# Patient Record
Sex: MALE | Race: OTHER | HISPANIC OR LATINO | Employment: UNEMPLOYED | URBAN - METROPOLITAN AREA
[De-identification: names, ages, dates, MRNs, and addresses within clinical notes are randomized per-mention and may not be internally consistent; named-entity substitution may affect disease eponyms.]

---

## 2023-01-09 ENCOUNTER — HOSPITAL ENCOUNTER (EMERGENCY)
Facility: HOSPITAL | Age: 33
Discharge: HOME | End: 2023-01-09
Attending: FAMILY MEDICINE

## 2023-01-09 VITALS
BODY MASS INDEX: 42.8 KG/M2 | HEIGHT: 67 IN | DIASTOLIC BLOOD PRESSURE: 89 MMHG | SYSTOLIC BLOOD PRESSURE: 139 MMHG | TEMPERATURE: 98 F | OXYGEN SATURATION: 97 % | WEIGHT: 272.69 LBS | RESPIRATION RATE: 18 BRPM | HEART RATE: 84 BPM

## 2023-01-09 DIAGNOSIS — M54.31 SCIATICA OF RIGHT SIDE: Primary | ICD-10-CM

## 2023-01-09 DIAGNOSIS — M79.651 RIGHT THIGH PAIN: ICD-10-CM

## 2023-01-09 PROCEDURE — 96372 THER/PROPH/DIAG INJ SC/IM: CPT | Performed by: NURSE PRACTITIONER

## 2023-01-09 PROCEDURE — 63600175 PHARM REV CODE 636 W HCPCS: Performed by: NURSE PRACTITIONER

## 2023-01-09 PROCEDURE — 99284 EMERGENCY DEPT VISIT MOD MDM: CPT

## 2023-01-09 RX ORDER — BACLOFEN 10 MG/1
10 TABLET ORAL 3 TIMES DAILY PRN
Qty: 21 TABLET | Refills: 0 | Status: SHIPPED | OUTPATIENT
Start: 2023-01-09 | End: 2023-01-16

## 2023-01-09 RX ORDER — DICLOFENAC SODIUM 75 MG/1
75 TABLET, DELAYED RELEASE ORAL 2 TIMES DAILY
Qty: 14 TABLET | Refills: 0 | Status: SHIPPED | OUTPATIENT
Start: 2023-01-09 | End: 2023-01-16

## 2023-01-09 RX ORDER — KETOROLAC TROMETHAMINE 30 MG/ML
30 INJECTION, SOLUTION INTRAMUSCULAR; INTRAVENOUS
Status: COMPLETED | OUTPATIENT
Start: 2023-01-09 | End: 2023-01-09

## 2023-01-09 RX ADMIN — KETOROLAC TROMETHAMINE 30 MG: 30 INJECTION, SOLUTION INTRAMUSCULAR; INTRAVENOUS at 09:01
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
## 2024-07-24 ENCOUNTER — HOSPITAL ENCOUNTER (EMERGENCY)
Facility: HOSPITAL | Age: 34
Discharge: HOME | End: 2024-07-24
Attending: STUDENT IN AN ORGANIZED HEALTH CARE EDUCATION/TRAINING PROGRAM

## 2024-07-24 VITALS
BODY MASS INDEX: 39.14 KG/M2 | HEART RATE: 85 BPM | TEMPERATURE: 98 F | RESPIRATION RATE: 20 BRPM | HEIGHT: 70 IN | DIASTOLIC BLOOD PRESSURE: 88 MMHG | WEIGHT: 273.38 LBS | SYSTOLIC BLOOD PRESSURE: 136 MMHG | OXYGEN SATURATION: 99 %

## 2024-07-24 DIAGNOSIS — H60.501 ACUTE OTITIS EXTERNA OF RIGHT EAR, UNSPECIFIED TYPE: Primary | ICD-10-CM

## 2024-07-24 PROCEDURE — 99283 EMERGENCY DEPT VISIT LOW MDM: CPT

## 2024-07-24 RX ORDER — OFLOXACIN 3 MG/ML
10 SOLUTION AURICULAR (OTIC) 2 TIMES DAILY
Qty: 10 ML | Refills: 0 | Status: SHIPPED | OUTPATIENT
Start: 2024-07-24 | End: 2024-07-24

## 2024-07-24 RX ORDER — OFLOXACIN 3 MG/ML
10 SOLUTION AURICULAR (OTIC) DAILY
Qty: 10 ML | Refills: 0 | Status: SHIPPED | OUTPATIENT
Start: 2024-07-24 | End: 2024-07-31

## 2024-07-25 NOTE — ED PROVIDER NOTES
Encounter Date: 7/24/2024       History     Chief Complaint   Patient presents with    Otalgia     Bilat otalgia and fullness. Denies hearing changes or drainage     Rashaad Jj is a 33 y.o. male who presents to the ED complaining of right ear pain x 2 days. Reports ear feels swollen. Denies fevers, chills, congestion, hearing loss, drainage.     The history is provided by the patient.     Review of patient's allergies indicates:  No Known Allergies  No past medical history on file.  No past surgical history on file.  No family history on file.  Social History     Tobacco Use    Smoking status: Never    Smokeless tobacco: Never   Substance Use Topics    Alcohol use: Never    Drug use: Never     Review of Systems   Constitutional:  Negative for activity change, chills and fever.   HENT:  Positive for ear pain. Negative for congestion and trouble swallowing.    Eyes:  Negative for photophobia and visual disturbance.   Respiratory:  Negative for chest tightness, shortness of breath and wheezing.    Cardiovascular:  Negative for chest pain, palpitations and leg swelling.   Gastrointestinal:  Negative for abdominal pain, constipation, diarrhea, nausea and vomiting.   Genitourinary:  Negative for dysuria, frequency, hematuria and urgency.   Musculoskeletal:  Negative for arthralgias, back pain and gait problem.   Skin:  Negative for color change and rash.   Neurological:  Negative for dizziness, syncope, weakness, light-headedness, numbness and headaches.   Psychiatric/Behavioral:  Negative for agitation and confusion. The patient is not nervous/anxious.        Physical Exam     Initial Vitals [07/24/24 1922]   BP Pulse Resp Temp SpO2   (!) 155/100 87 18 98 °F (36.7 °C) 98 %      MAP       --         Physical Exam    Nursing note and vitals reviewed.  Constitutional: He appears well-developed and well-nourished. No distress.   HENT:   Head: Normocephalic and atraumatic.   Right Ear: There is drainage.  Tympanic membrane is not erythematous. A middle ear effusion is present.   Left Ear: Tympanic membrane, external ear and ear canal normal.   Mouth/Throat: No oropharyngeal exudate.   Eyes: EOM are normal. No scleral icterus.   Neck: Neck supple.   Normal range of motion.  Cardiovascular:  Normal rate and regular rhythm.           No murmur heard.  Pulmonary/Chest: No respiratory distress. He has no wheezes.   Abdominal: Abdomen is soft. He exhibits no distension. There is no abdominal tenderness.   Musculoskeletal:         General: No edema. Normal range of motion.      Cervical back: Normal range of motion and neck supple.     Neurological: He is alert and oriented to person, place, and time. No cranial nerve deficit.   Skin: Skin is warm and dry. Capillary refill takes less than 2 seconds. No erythema.   Psychiatric: He has a normal mood and affect. Thought content normal.         ED Course   Procedures  Labs Reviewed - No data to display       Imaging Results    None          Medications - No data to display  Medical Decision Making  Right otitis externa.pt stable for discharge with ofloxacin drops x 7 days. Instructed to follow up with PCP in 3 days. ED return precautions given. He verbalized understanding. All questions answered.     Risk  Prescription drug management.                                      Clinical Impression:  Final diagnoses:  [H60.501] Acute otitis externa of right ear, unspecified type (Primary)          ED Disposition Condition    Discharge Stable          ED Prescriptions       Medication Sig Dispense Start Date End Date Auth. Provider    ofloxacin (FLOXIN) 0.3 % otic solution  (Status: Discontinued) Place 10 drops into the right ear 2 (two) times daily. for 7 days 10 mL 7/24/2024 7/24/2024 Indira Sheehan PA-C    ofloxacin (FLOXIN) 0.3 % otic solution Place 10 drops into the right ear once daily. for 7 days 10 mL 7/24/2024 7/31/2024 Indira Sheehan PA-C          Follow-up  Information       Follow up With Specialties Details Why Contact Info    [facility] Emergency Medicine  If symptoms worsen [address] [facility] [address] [phone]    PCP  In 3 days Hospital follow up              Indira Sheehan PA-C  07/24/24 2011

## 2024-10-01 ENCOUNTER — HOSPITAL ENCOUNTER (EMERGENCY)
Facility: HOSPITAL | Age: 34
Discharge: HOME | End: 2024-10-01
Attending: EMERGENCY MEDICINE

## 2024-10-01 VITALS
RESPIRATION RATE: 16 BRPM | OXYGEN SATURATION: 97 % | HEART RATE: 98 BPM | TEMPERATURE: 98 F | DIASTOLIC BLOOD PRESSURE: 92 MMHG | SYSTOLIC BLOOD PRESSURE: 121 MMHG | WEIGHT: 270 LBS | HEIGHT: 68 IN | BODY MASS INDEX: 40.92 KG/M2

## 2024-10-01 DIAGNOSIS — R10.9 ABDOMINAL PAIN, UNSPECIFIED ABDOMINAL LOCATION: Primary | ICD-10-CM

## 2024-10-01 LAB
ALBUMIN SERPL-MCNC: 4.3 G/DL (ref 3.5–5)
ALBUMIN/GLOB SERPL: 1.1 RATIO (ref 1.1–2)
ALP SERPL-CCNC: 61 UNIT/L (ref 40–150)
ALT SERPL-CCNC: 31 UNIT/L (ref 0–55)
ANION GAP SERPL CALC-SCNC: 9 MEQ/L
AST SERPL-CCNC: 18 UNIT/L (ref 5–34)
BASOPHILS # BLD AUTO: 0.05 X10(3)/MCL
BASOPHILS NFR BLD AUTO: 0.5 %
BILIRUB SERPL-MCNC: 0.3 MG/DL
BILIRUB UR QL STRIP.AUTO: NEGATIVE
BUN SERPL-MCNC: 16.3 MG/DL (ref 8.9–20.6)
CALCIUM SERPL-MCNC: 10.6 MG/DL (ref 8.4–10.2)
CHLORIDE SERPL-SCNC: 107 MMOL/L (ref 98–107)
CLARITY UR: CLEAR
CO2 SERPL-SCNC: 24 MMOL/L (ref 22–29)
COLOR UR AUTO: NORMAL
CREAT SERPL-MCNC: 0.88 MG/DL (ref 0.73–1.18)
CREAT/UREA NIT SERPL: 19
EOSINOPHIL # BLD AUTO: 0.18 X10(3)/MCL (ref 0–0.9)
EOSINOPHIL NFR BLD AUTO: 1.8 %
ERYTHROCYTE [DISTWIDTH] IN BLOOD BY AUTOMATED COUNT: 13.6 % (ref 11.5–17)
GFR SERPLBLD CREATININE-BSD FMLA CKD-EPI: >60 ML/MIN/1.73/M2
GLOBULIN SER-MCNC: 3.8 GM/DL (ref 2.4–3.5)
GLUCOSE SERPL-MCNC: 102 MG/DL (ref 74–100)
GLUCOSE UR QL STRIP: NEGATIVE
HCT VFR BLD AUTO: 44.1 % (ref 42–52)
HGB BLD-MCNC: 14.3 G/DL (ref 14–18)
HGB UR QL STRIP: NEGATIVE
IMM GRANULOCYTES # BLD AUTO: 0.04 X10(3)/MCL (ref 0–0.04)
IMM GRANULOCYTES NFR BLD AUTO: 0.4 %
KETONES UR QL STRIP: NEGATIVE
LEUKOCYTE ESTERASE UR QL STRIP: NEGATIVE
LIPASE SERPL-CCNC: 29 U/L
LYMPHOCYTES # BLD AUTO: 3.08 X10(3)/MCL (ref 0.6–4.6)
LYMPHOCYTES NFR BLD AUTO: 31 %
MCH RBC QN AUTO: 25.6 PG (ref 27–31)
MCHC RBC AUTO-ENTMCNC: 32.4 G/DL (ref 33–36)
MCV RBC AUTO: 79 FL (ref 80–94)
MONOCYTES # BLD AUTO: 1.23 X10(3)/MCL (ref 0.1–1.3)
MONOCYTES NFR BLD AUTO: 12.4 %
NEUTROPHILS # BLD AUTO: 5.37 X10(3)/MCL (ref 2.1–9.2)
NEUTROPHILS NFR BLD AUTO: 53.9 %
NITRITE UR QL STRIP: NEGATIVE
NRBC BLD AUTO-RTO: 0 %
PH UR STRIP: 6.5 [PH]
PLATELET # BLD AUTO: 267 X10(3)/MCL (ref 130–400)
PMV BLD AUTO: 10 FL (ref 7.4–10.4)
POTASSIUM SERPL-SCNC: 4.3 MMOL/L (ref 3.5–5.1)
PROT SERPL-MCNC: 8.1 GM/DL (ref 6.4–8.3)
PROT UR QL STRIP: NEGATIVE
RBC # BLD AUTO: 5.58 X10(6)/MCL (ref 4.7–6.1)
SODIUM SERPL-SCNC: 140 MMOL/L (ref 136–145)
SP GR UR STRIP.AUTO: 1.01 (ref 1–1.03)
UROBILINOGEN UR STRIP-ACNC: 0.2
WBC # BLD AUTO: 9.95 X10(3)/MCL (ref 4.5–11.5)

## 2024-10-01 PROCEDURE — 25500020 PHARM REV CODE 255: Performed by: NURSE PRACTITIONER

## 2024-10-01 PROCEDURE — 85025 COMPLETE CBC W/AUTO DIFF WBC: CPT | Performed by: NURSE PRACTITIONER

## 2024-10-01 PROCEDURE — 96374 THER/PROPH/DIAG INJ IV PUSH: CPT

## 2024-10-01 PROCEDURE — 83690 ASSAY OF LIPASE: CPT | Performed by: NURSE PRACTITIONER

## 2024-10-01 PROCEDURE — 81003 URINALYSIS AUTO W/O SCOPE: CPT | Performed by: NURSE PRACTITIONER

## 2024-10-01 PROCEDURE — 63600175 PHARM REV CODE 636 W HCPCS: Performed by: NURSE PRACTITIONER

## 2024-10-01 PROCEDURE — 80053 COMPREHEN METABOLIC PANEL: CPT | Performed by: NURSE PRACTITIONER

## 2024-10-01 PROCEDURE — 25000003 PHARM REV CODE 250: Performed by: NURSE PRACTITIONER

## 2024-10-01 PROCEDURE — 99285 EMERGENCY DEPT VISIT HI MDM: CPT | Mod: 25

## 2024-10-01 RX ORDER — KETOROLAC TROMETHAMINE 30 MG/ML
30 INJECTION, SOLUTION INTRAMUSCULAR; INTRAVENOUS
Status: COMPLETED | OUTPATIENT
Start: 2024-10-01 | End: 2024-10-01

## 2024-10-01 RX ORDER — HYDROCODONE BITARTRATE AND ACETAMINOPHEN 5; 325 MG/1; MG/1
1 TABLET ORAL EVERY 6 HOURS PRN
Qty: 12 TABLET | Refills: 0 | Status: SHIPPED | OUTPATIENT
Start: 2024-10-01 | End: 2024-10-13

## 2024-10-01 RX ORDER — SODIUM CHLORIDE 9 MG/ML
1000 INJECTION, SOLUTION INTRAVENOUS
Status: COMPLETED | OUTPATIENT
Start: 2024-10-01 | End: 2024-10-01

## 2024-10-01 RX ORDER — HYDROCODONE BITARTRATE AND ACETAMINOPHEN 7.5; 325 MG/1; MG/1
1 TABLET ORAL
Status: COMPLETED | OUTPATIENT
Start: 2024-10-01 | End: 2024-10-01

## 2024-10-01 RX ADMIN — IOHEXOL 100 ML: 350 INJECTION, SOLUTION INTRAVENOUS at 06:10

## 2024-10-01 RX ADMIN — KETOROLAC TROMETHAMINE 30 MG: 30 INJECTION, SOLUTION INTRAMUSCULAR; INTRAVENOUS at 04:10

## 2024-10-01 RX ADMIN — HYDROCODONE BITARTRATE AND ACETAMINOPHEN 1 TABLET: 7.5; 325 TABLET ORAL at 07:10

## 2024-10-01 RX ADMIN — SODIUM CHLORIDE 1000 ML: 9 INJECTION, SOLUTION INTRAVENOUS at 04:10

## 2024-10-01 NOTE — ED PROVIDER NOTES
Encounter Date: 10/1/2024       History     Chief Complaint   Patient presents with    Abdominal Pain     LUQ abdominal pain x3 weeks; denies other symptoms     An  was used for patient interaction.  Patient states generalized abdominal pain intermittently x3 weeks.  Denies any nausea, vomiting, diarrhea, constipation, or fever.  States that pain feels like bloating or pressure.  Denies any dysuria.  Patient denies any past medical history.    The history is provided by the patient. The history is limited by a language barrier. A  was used.   Abdominal Pain  The current episode started several weeks ago. The onset of the illness was gradual. The problem has not changed since onset.The abdominal pain is generalized. The abdominal pain does not radiate. The severity of the abdominal pain is 8/10. The abdominal pain is relieved by nothing. The other symptoms of the illness do not include fever, nausea, vomiting, diarrhea or dysuria.     Review of patient's allergies indicates:  No Known Allergies  No past medical history on file.  No past surgical history on file.  No family history on file.  Social History     Tobacco Use    Smoking status: Never    Smokeless tobacco: Never   Substance Use Topics    Alcohol use: Never    Drug use: Never     Review of Systems   Constitutional: Negative.  Negative for fever.   HENT: Negative.     Eyes: Negative.    Respiratory: Negative.     Cardiovascular: Negative.    Gastrointestinal:  Positive for abdominal pain. Negative for diarrhea, nausea and vomiting.   Endocrine: Negative.    Genitourinary: Negative.  Negative for dysuria.   Musculoskeletal: Negative.    Skin: Negative.    Allergic/Immunologic: Negative.    Neurological: Negative.    Hematological: Negative.    Psychiatric/Behavioral: Negative.     All other systems reviewed and are negative.      Physical Exam     Initial Vitals [10/01/24 1517]   BP Pulse Resp Temp SpO2   (!) 121/92 98 18 98.1  °F (36.7 °C) 97 %      MAP       --         Physical Exam    Nursing note and vitals reviewed.  Constitutional: He appears well-developed and well-nourished. No distress.   HENT:   Head: Normocephalic and atraumatic. Mouth/Throat: Oropharynx is clear and moist.   Eyes: Conjunctivae and EOM are normal. Pupils are equal, round, and reactive to light.   Neck: Neck supple.   Normal range of motion.  Cardiovascular:  Normal rate, regular rhythm, normal heart sounds and intact distal pulses.           Pulmonary/Chest: Breath sounds normal. No respiratory distress. He has no wheezes.   Abdominal: Abdomen is soft. Bowel sounds are normal. He exhibits no distension. There is no abdominal tenderness.   Musculoskeletal:         General: No edema. Normal range of motion.      Cervical back: Normal range of motion and neck supple.     Neurological: He is alert and oriented to person, place, and time. He has normal strength. GCS score is 15. GCS eye subscore is 4. GCS verbal subscore is 5. GCS motor subscore is 6.   Skin: Skin is warm and dry. No rash noted.   Psychiatric: He has a normal mood and affect. Thought content normal.         ED Course   Procedures  Labs Reviewed   COMPREHENSIVE METABOLIC PANEL - Abnormal       Result Value    Sodium 140      Potassium 4.3      Chloride 107      CO2 24      Glucose 102 (*)     Blood Urea Nitrogen 16.3      Creatinine 0.88      Calcium 10.6 (*)     Protein Total 8.1      Albumin 4.3      Globulin 3.8 (*)     Albumin/Globulin Ratio 1.1      Bilirubin Total 0.3      ALP 61      ALT 31      AST 18      eGFR >60      Anion Gap 9.0      BUN/Creatinine Ratio 19     CBC WITH DIFFERENTIAL - Abnormal    WBC 9.95      RBC 5.58      Hgb 14.3      Hct 44.1      MCV 79.0 (*)     MCH 25.6 (*)     MCHC 32.4 (*)     RDW 13.6      Platelet 267      MPV 10.0      Neut % 53.9      Lymph % 31.0      Mono % 12.4      Eos % 1.8      Basophil % 0.5      Lymph # 3.08      Neut # 5.37      Mono # 1.23      Eos  # 0.18      Baso # 0.05      IG# 0.04      IG% 0.4      NRBC% 0.0     URINALYSIS, REFLEX TO URINE CULTURE - Normal    Color, UA Straw      Appearance, UA Clear      Specific Gravity, UA 1.015      pH, UA 6.5      Protein, UA Negative      Glucose, UA Negative      Ketones, UA Negative      Blood, UA Negative      Bilirubin, UA Negative      Urobilinogen, UA 0.2      Nitrites, UA Negative      Leukocyte Esterase, UA Negative     LIPASE - Normal    Lipase Level 29     CBC W/ AUTO DIFFERENTIAL    Narrative:     The following orders were created for panel order CBC Auto Differential.  Procedure                               Abnormality         Status                     ---------                               -----------         ------                     CBC with Differential[[id number]]        Abnormal            Final result                 Please view results for these tests on the individual orders.          Imaging Results              CT Abdomen Pelvis With IV Contrast NO Oral Contrast (Final result)  Result time 10/01/24 18:28:58      Final result by Kelsi Allen MD (10/01/24 18:28:58)                   Impression:      No abnormality seen      Electronically signed by: Royal Allen  Date:    10/01/2024  Time:    18:28               Narrative:    EXAMINATION:  CT ABDOMEN PELVIS WITH IV CONTRAST    CLINICAL HISTORY:  Abdominal abscess/infection suspected;    TECHNIQUE:  Low dose axial images, sagittal and coronal reformations were obtained from the lung bases to the pubic symphysis following the IV administration of contrast. Automatic exposure control (AEC) is utilized to reduce patient radiation exposure.    COMPARISON:  01/16/2019    FINDINGS:  The lung bases are clear.    The liver appears normal.  No liver mass or lesion is seen.  Portal and hepatic veins appear normal.    The gallbladder appears normal.  No obvious gallstones are seen.  No biliary dilatation is seen.  No pericholecystic fluid is  seen.    The pancreas appears normal.  No pancreatic mass or lesion is seen.    The spleen shows no acute abnormality.    The adrenal glands appear normal.  No adrenal nodule is seen.    The kidneys appear normal.  No hydronephrosis is seen.  No hydroureter is seen.  No nephrolithiasis is seen.  No obvious ureteral stones are seen.    Urinary bladder appears grossly unremarkable.    No colitis is seen.  No diverticulitis is seen.  No obvious colonic mass or lesion is seen.  The appendix appears normal    No free air is seen.  No free fluid is seen.                                       Medications   HYDROcodone-acetaminophen 7.5-325 mg per tablet 1 tablet (has no administration in time range)   ketorolac injection 30 mg (30 mg Intravenous Given 10/1/24 1652)   0.9%  NaCl infusion (1,000 mLs Intravenous New Bag 10/1/24 1653)   iohexoL (OMNIPAQUE 350) injection 100 mL (100 mLs Intravenous Given 10/1/24 1816)     Medical Decision Making  An  was used for patient interaction.  Patient states generalized abdominal pain intermittently x3 weeks.  Denies any nausea, vomiting, diarrhea, constipation, or fever.  States that pain feels like bloating or pressure.  Denies any dysuria.  Patient denies any past medical history.    The history is provided by the patient. The history is limited by a language barrier. A  was used.   Abdominal Pain  The current episode started several weeks ago. The onset of the illness was gradual. The problem has not changed since onset.The abdominal pain is generalized. The abdominal pain does not radiate. The severity of the abdominal pain is 8/10. The abdominal pain is relieved by nothing. The other symptoms of the illness do not include fever, nausea, vomiting, diarrhea or dysuria.       Amount and/or Complexity of Data Reviewed  Labs: ordered. Decision-making details documented in ED Course.  Radiology: ordered. Decision-making details documented in ED  Course.  Discussion of management or test interpretation with external provider(s): Differential diagnosis (including but not limited to):   Judging by the patient's chief complaint and pertinent history, the patient has the following possible differential diagnoses, including but not limited to the following.  Some of these are deemed to be lower likelihood and some more likely based on my physical exam and history combined with possible lab work and/or imaging studies.   Please see the pertinent studies, and refer to the HPI.  Some of these diagnoses will take further evaluation to fully rule out, perhaps as an outpatient and the patient was encouraged to follow up when discharged for more comprehensive evaluation.  Abdominal pain, gastritis, appendicitis, diverticulitis  Patient's labs are unremarkable.  Patient's CT scan of his abdomen and pelvis does not show any acute change or abnormality.  Discussed results with patient.  Will discharge patient with pain control and ED return precautions.    Risk  Prescription drug management.               ED Course as of 10/01/24 1906   Tue Oct 01, 2024   1706 CBC Auto Differential(!) [AB]   1857 Lipase [AB]   1858 Lipase: 29 [AB]   1858 Urinalysis, Reflex to Urine Culture [AB]   1858 CT Abdomen Pelvis With IV Contrast NO Oral Contrast [AB]      ED Course User Index  [AB] Isa Angeles FNP                             Clinical Impression:  Final diagnoses:  [R10.9] Abdominal pain, unspecified abdominal location (Primary)          ED Disposition Condition    Discharge Stable          ED Prescriptions       Medication Sig Dispense Start Date End Date Auth. Provider    HYDROcodone-acetaminophen (NORCO) 5-325 mg per tablet Take 1 tablet by mouth every 6 (six) hours as needed for Pain. 12 tablet 10/1/2024 10/13/2024 Isa Angeles FNP          Follow-up Information       Follow up With Specialties Details Why Contact Info    Primary Care Provider  In 3 days                Karthik, Isa HUERTAS, [facility]  10/01/24 1908

## 2024-10-12 ENCOUNTER — HOSPITAL ENCOUNTER (EMERGENCY)
Facility: HOSPITAL | Age: 34
Discharge: HOME | End: 2024-10-12
Attending: EMERGENCY MEDICINE

## 2024-10-12 VITALS
BODY MASS INDEX: 32.89 KG/M2 | TEMPERATURE: 98 F | SYSTOLIC BLOOD PRESSURE: 153 MMHG | WEIGHT: 217 LBS | RESPIRATION RATE: 20 BRPM | HEIGHT: 68 IN | OXYGEN SATURATION: 98 % | HEART RATE: 83 BPM | DIASTOLIC BLOOD PRESSURE: 95 MMHG

## 2024-10-12 DIAGNOSIS — K29.70 GASTRITIS, PRESENCE OF BLEEDING UNSPECIFIED, UNSPECIFIED CHRONICITY, UNSPECIFIED GASTRITIS TYPE: Primary | ICD-10-CM

## 2024-10-12 LAB
ALBUMIN SERPL-MCNC: 4.7 G/DL (ref 3.5–5)
ALBUMIN/GLOB SERPL: 1.1 RATIO (ref 1.1–2)
ALP SERPL-CCNC: 70 UNIT/L (ref 40–150)
ALT SERPL-CCNC: 39 UNIT/L (ref 0–55)
ANION GAP SERPL CALC-SCNC: 7 MEQ/L
AST SERPL-CCNC: 22 UNIT/L (ref 5–34)
BASOPHILS # BLD AUTO: 0.07 X10(3)/MCL
BASOPHILS NFR BLD AUTO: 0.6 %
BILIRUB SERPL-MCNC: 0.3 MG/DL
BILIRUB UR QL STRIP.AUTO: NEGATIVE
BUN SERPL-MCNC: 18.7 MG/DL (ref 8.9–20.6)
CALCIUM SERPL-MCNC: 10.5 MG/DL (ref 8.4–10.2)
CHLORIDE SERPL-SCNC: 105 MMOL/L (ref 98–107)
CLARITY UR: CLEAR
CO2 SERPL-SCNC: 26 MMOL/L (ref 22–29)
COLOR UR AUTO: NORMAL
CREAT SERPL-MCNC: 0.88 MG/DL (ref 0.73–1.18)
CREAT/UREA NIT SERPL: 21
EOSINOPHIL # BLD AUTO: 0.02 X10(3)/MCL (ref 0–0.9)
EOSINOPHIL NFR BLD AUTO: 0.2 %
ERYTHROCYTE [DISTWIDTH] IN BLOOD BY AUTOMATED COUNT: 13.4 % (ref 11.5–17)
GFR SERPLBLD CREATININE-BSD FMLA CKD-EPI: >60 ML/MIN/1.73/M2
GLOBULIN SER-MCNC: 4.1 GM/DL (ref 2.4–3.5)
GLUCOSE SERPL-MCNC: 125 MG/DL (ref 74–100)
GLUCOSE UR QL STRIP: NEGATIVE
HCT VFR BLD AUTO: 43.2 % (ref 42–52)
HGB BLD-MCNC: 13.9 G/DL (ref 14–18)
HGB UR QL STRIP: NEGATIVE
IMM GRANULOCYTES # BLD AUTO: 0.05 X10(3)/MCL (ref 0–0.04)
IMM GRANULOCYTES NFR BLD AUTO: 0.4 %
KETONES UR QL STRIP: NEGATIVE
LEUKOCYTE ESTERASE UR QL STRIP: NEGATIVE
LIPASE SERPL-CCNC: 32 U/L
LYMPHOCYTES # BLD AUTO: 2.68 X10(3)/MCL (ref 0.6–4.6)
LYMPHOCYTES NFR BLD AUTO: 23.6 %
MCH RBC QN AUTO: 25.5 PG (ref 27–31)
MCHC RBC AUTO-ENTMCNC: 32.2 G/DL (ref 33–36)
MCV RBC AUTO: 79.3 FL (ref 80–94)
MONOCYTES # BLD AUTO: 0.34 X10(3)/MCL (ref 0.1–1.3)
MONOCYTES NFR BLD AUTO: 3 %
NEUTROPHILS # BLD AUTO: 8.19 X10(3)/MCL (ref 2.1–9.2)
NEUTROPHILS NFR BLD AUTO: 72.2 %
NITRITE UR QL STRIP: NEGATIVE
NRBC BLD AUTO-RTO: 0 %
PH UR STRIP: 8.5 [PH]
PLATELET # BLD AUTO: 271 X10(3)/MCL (ref 130–400)
PMV BLD AUTO: 9.9 FL (ref 7.4–10.4)
POTASSIUM SERPL-SCNC: 4.6 MMOL/L (ref 3.5–5.1)
PROT SERPL-MCNC: 8.8 GM/DL (ref 6.4–8.3)
PROT UR QL STRIP: NEGATIVE
RBC # BLD AUTO: 5.45 X10(6)/MCL (ref 4.7–6.1)
SODIUM SERPL-SCNC: 138 MMOL/L (ref 136–145)
SP GR UR STRIP.AUTO: 1.02 (ref 1–1.03)
UROBILINOGEN UR STRIP-ACNC: 0.2
WBC # BLD AUTO: 11.35 X10(3)/MCL (ref 4.5–11.5)

## 2024-10-12 PROCEDURE — 81003 URINALYSIS AUTO W/O SCOPE: CPT | Performed by: EMERGENCY MEDICINE

## 2024-10-12 PROCEDURE — 25000003 PHARM REV CODE 250: Performed by: EMERGENCY MEDICINE

## 2024-10-12 PROCEDURE — 99284 EMERGENCY DEPT VISIT MOD MDM: CPT

## 2024-10-12 PROCEDURE — 85025 COMPLETE CBC W/AUTO DIFF WBC: CPT | Performed by: EMERGENCY MEDICINE

## 2024-10-12 PROCEDURE — 83690 ASSAY OF LIPASE: CPT | Performed by: EMERGENCY MEDICINE

## 2024-10-12 PROCEDURE — 80053 COMPREHEN METABOLIC PANEL: CPT | Performed by: EMERGENCY MEDICINE

## 2024-10-12 RX ORDER — ALUMINUM HYDROXIDE, MAGNESIUM HYDROXIDE, AND SIMETHICONE 1200; 120; 1200 MG/30ML; MG/30ML; MG/30ML
30 SUSPENSION ORAL ONCE
Status: COMPLETED | OUTPATIENT
Start: 2024-10-12 | End: 2024-10-12

## 2024-10-12 RX ORDER — SUCRALFATE 1 G/1
1 TABLET ORAL 2 TIMES DAILY
Qty: 30 TABLET | Refills: 0 | Status: SHIPPED | OUTPATIENT
Start: 2024-10-12

## 2024-10-12 RX ORDER — PANTOPRAZOLE SODIUM 40 MG/1
40 TABLET, DELAYED RELEASE ORAL DAILY
Qty: 90 TABLET | Refills: 3 | Status: SHIPPED | OUTPATIENT
Start: 2024-10-12 | End: 2025-10-12

## 2024-10-12 RX ORDER — LIDOCAINE HYDROCHLORIDE 20 MG/ML
15 SOLUTION OROPHARYNGEAL ONCE
Status: COMPLETED | OUTPATIENT
Start: 2024-10-12 | End: 2024-10-12

## 2024-10-12 RX ADMIN — LIDOCAINE HYDROCHLORIDE 15 ML: 20 SOLUTION ORAL at 02:10

## 2024-10-12 RX ADMIN — ALUMINUM HYDROXIDE, MAGNESIUM HYDROXIDE, AND SIMETHICONE 30 ML: 1200; 120; 1200 SUSPENSION ORAL at 02:10

## 2024-10-12 NOTE — DISCHARGE INSTRUCTIONS
You can also take Maalox over-the-counter medicine as directed    Follow up with your primary care doctor in a week    It is important that you take Protonix for several weeks before deciding it is not working

## 2024-10-12 NOTE — ED PROVIDER NOTES
Encounter Date: 10/12/2024       History     Chief Complaint   Patient presents with    Abdominal Pain     Pain in stomach for a month. Seen here 10 days ago and by family MD Thursday with no releif     34-year-old male no past medical history except for hypertension presents with 1 month of left upper quadrant abdominal pain which is described as constant and tingling.  Pain is worse with eating.  Patient has been seen by his primary care doctor as well as here in the emergency department he says both times he was prescribed Norco which are not helping.  He is also taking Prilosec for about 4 days which has been helping some.  No nausea, vomiting or constipation    The history is provided by the patient. A  was used.     Review of patient's allergies indicates:  No Known Allergies  No past medical history on file.  No past surgical history on file.  No family history on file.  Social History     Tobacco Use    Smoking status: Never    Smokeless tobacco: Never   Substance Use Topics    Alcohol use: Never    Drug use: Never     Review of Systems   Constitutional:  Negative for chills, diaphoresis, fatigue and fever.   HENT:  Negative for congestion, drooling, ear discharge, ear pain, postnasal drip, rhinorrhea, sinus pressure, sinus pain, sore throat and tinnitus.    Eyes:  Negative for discharge.   Respiratory:  Negative for cough, chest tightness, shortness of breath and wheezing.    Cardiovascular:  Negative for chest pain and palpitations.   Gastrointestinal:  Positive for abdominal pain and diarrhea. Negative for nausea and vomiting.   Genitourinary:  Negative for frequency, hematuria and urgency.   Musculoskeletal:  Negative for back pain, neck pain and neck stiffness.   Skin:  Negative for rash.   Neurological:  Negative for syncope, weakness, light-headedness, numbness and headaches.   Psychiatric/Behavioral:  Negative for suicidal ideas.        Physical Exam     Initial Vitals [10/12/24  0012]   BP Pulse Resp Temp SpO2   (!) 153/95 83 20 98.2 °F (36.8 °C) 98 %      MAP       --         Physical Exam    Nursing note and vitals reviewed.  Constitutional: He appears well-nourished. No distress.   HENT:   Head: Atraumatic.   Eyes: Conjunctivae are normal.   Neck:   Normal range of motion.  Cardiovascular:  Normal rate.           Pulmonary/Chest: No respiratory distress.   Abdominal: Abdomen is soft. There is no abdominal tenderness. There is no guarding.   Musculoskeletal:         General: Normal range of motion.      Cervical back: Normal range of motion.     Neurological: He is alert and oriented to person, place, and time. He has normal strength.   Skin: Skin is warm and dry.         ED Course   Procedures  Labs Reviewed   CBC WITH DIFFERENTIAL - Abnormal       Result Value    WBC 11.35      RBC 5.45      Hgb 13.9 (*)     Hct 43.2      MCV 79.3 (*)     MCH 25.5 (*)     MCHC 32.2 (*)     RDW 13.4      Platelet 271      MPV 9.9      Neut % 72.2      Lymph % 23.6      Mono % 3.0      Eos % 0.2      Basophil % 0.6      Lymph # 2.68      Neut # 8.19      Mono # 0.34      Eos # 0.02      Baso # 0.07      IG# 0.05 (*)     IG% 0.4      NRBC% 0.0     COMPREHENSIVE METABOLIC PANEL - Abnormal    Sodium 138      Potassium 4.6      Chloride 105      CO2 26      Glucose 125 (*)     Blood Urea Nitrogen 18.7      Creatinine 0.88      Calcium 10.5 (*)     Protein Total 8.8 (*)     Albumin 4.7      Globulin 4.1 (*)     Albumin/Globulin Ratio 1.1      Bilirubin Total 0.3      ALP 70      ALT 39      AST 22      eGFR >60      Anion Gap 7.0      BUN/Creatinine Ratio 21     LIPASE - Normal    Lipase Level 32     URINALYSIS, REFLEX TO URINE CULTURE - Normal    Color, UA Light-Yellow      Appearance, UA Clear      Specific Gravity, UA 1.020      pH, UA 8.5      Protein, UA Negative      Glucose, UA Negative      Ketones, UA Negative      Blood, UA Negative      Bilirubin, UA Negative      Urobilinogen, UA 0.2      Nitrites,  UA Negative      Leukocyte Esterase, UA Negative     CBC W/ AUTO DIFFERENTIAL    Narrative:     The following orders were created for panel order CBC auto differential.  Procedure                               Abnormality         Status                     ---------                               -----------         ------                     CBC with Differential[[id number]]        Abnormal            Final result                 Please view results for these tests on the individual orders.          Imaging Results    None          Medications   aluminum-magnesium hydroxide-simethicone 200-200-20 mg/5 mL suspension 30 mL (has no administration in time range)     And   LIDOcaine viscous HCl 2% oral solution 15 mL (has no administration in time range)     Medical Decision Making  Medical Decision Making  Problem list/ differential diagnosis including but not limited to:  GERD, PUD, gastritis, pancreatitis, gallbladder disease, MI, splenic infarct/enlargement, pericarditis, dissection, hepatitis, pyelonephritis, pneumonia, bowel obstruction, SMA syndrome, PE    Patient's chronic illnesses impacting care:  none    Diagnostic test considered but not ordered:    My interpretations:      Radiology reports      Discussion of case with external qualified healthcare professionals:  Not applicable    Review of external notes( inpt, ems, NH, clinic):  October 1, 2024 patient CT abdomen pelvis which was negative    Decision rules/scores:    Medications reviewed:  Norco and Prilosec  Medications ordered in the ER:  GI cocktail  Discharge prescriptions:  Protonix, Carafate    Social variables possible impacting patient's healthcare:    Code status/discussion    Shared decision making:    Consideration for admission versus discharge: stable for discharge     Amount and/or Complexity of Data Reviewed  Labs: ordered. Decision-making details documented in ED Course.    Risk  OTC drugs.  Prescription drug management.               ED  Course as of 10/12/24 0203   Sat Oct 12, 2024   0147 Lipase: 32 [WC]   0147 ALP: 70 [WC]   0147 ALT: 39 [WC]   0147 AST: 22 [WC]      ED Course User Index  [WC] Marshall Deng MD                           Clinical Impression:  Final diagnoses:  [K29.70] Gastritis, presence of bleeding unspecified, unspecified chronicity, unspecified gastritis type (Primary)          ED Disposition Condition    Discharge Stable          ED Prescriptions       Medication Sig Dispense Start Date End Date Auth. Provider    pantoprazole (PROTONIX) 40 MG tablet Take 1 tablet (40 mg total) by mouth once daily. 90 tablet 10/12/2024 10/12/2025 Marshall Deng MD    sucralfate (CARAFATE) 1 gram tablet Take 1 tablet (1 g total) by mouth 2 (two) times daily. 30 tablet 10/12/2024 -- Marshall Deng MD          Follow-up Information    None          Marshall Deng MD  10/12/24 0203

## 2024-10-20 ENCOUNTER — HOSPITAL ENCOUNTER (OUTPATIENT)
Facility: HOSPITAL | Age: 34
Discharge: HOME | End: 2024-10-22
Attending: EMERGENCY MEDICINE | Admitting: INTERNAL MEDICINE

## 2024-10-20 DIAGNOSIS — A04.0 INTESTINAL INFECTION DUE TO ENTEROPATHOGENIC E. COLI: Primary | ICD-10-CM

## 2024-10-20 DIAGNOSIS — R07.9 CHEST PAIN: ICD-10-CM

## 2024-10-20 DIAGNOSIS — R10.12 LEFT UPPER QUADRANT ABDOMINAL PAIN: ICD-10-CM

## 2024-10-20 LAB
ALBUMIN SERPL-MCNC: 4.6 G/DL (ref 3.5–5)
ALBUMIN/GLOB SERPL: 1.2 RATIO (ref 1.1–2)
ALP SERPL-CCNC: 65 UNIT/L (ref 40–150)
ALT SERPL-CCNC: 64 UNIT/L (ref 0–55)
ANION GAP SERPL CALC-SCNC: 13 MEQ/L
AST SERPL-CCNC: 27 UNIT/L (ref 5–34)
BASOPHILS # BLD AUTO: 0.05 X10(3)/MCL
BASOPHILS NFR BLD AUTO: 0.3 %
BILIRUB SERPL-MCNC: 0.6 MG/DL
BILIRUB UR QL STRIP.AUTO: NEGATIVE
BUN SERPL-MCNC: 21.3 MG/DL (ref 8.9–20.6)
CALCIUM SERPL-MCNC: 10.2 MG/DL (ref 8.4–10.2)
CHLORIDE SERPL-SCNC: 102 MMOL/L (ref 98–107)
CLARITY UR: CLEAR
CO2 SERPL-SCNC: 26 MMOL/L (ref 22–29)
COLOR UR AUTO: NORMAL
CREAT SERPL-MCNC: 1.14 MG/DL (ref 0.72–1.25)
CREAT/UREA NIT SERPL: 19
EOSINOPHIL # BLD AUTO: 0.02 X10(3)/MCL (ref 0–0.9)
EOSINOPHIL NFR BLD AUTO: 0.1 %
ERYTHROCYTE [DISTWIDTH] IN BLOOD BY AUTOMATED COUNT: 14.2 % (ref 11.5–17)
GFR SERPLBLD CREATININE-BSD FMLA CKD-EPI: >60 ML/MIN/1.73/M2
GLOBULIN SER-MCNC: 4 GM/DL (ref 2.4–3.5)
GLUCOSE SERPL-MCNC: 123 MG/DL (ref 74–100)
GLUCOSE UR QL STRIP: NEGATIVE
HCT VFR BLD AUTO: 47 % (ref 42–52)
HEMOCCULT SP1 STL QL: NEGATIVE
HGB BLD-MCNC: 15.5 G/DL (ref 14–18)
HGB UR QL STRIP: NEGATIVE
IMM GRANULOCYTES # BLD AUTO: 0.14 X10(3)/MCL (ref 0–0.04)
IMM GRANULOCYTES NFR BLD AUTO: 0.8 %
KETONES UR QL STRIP: NEGATIVE
LEUKOCYTE ESTERASE UR QL STRIP: NEGATIVE
LIPASE SERPL-CCNC: 30 U/L
LYMPHOCYTES # BLD AUTO: 4.14 X10(3)/MCL (ref 0.6–4.6)
LYMPHOCYTES NFR BLD AUTO: 23.1 %
MCH RBC QN AUTO: 25.8 PG (ref 27–31)
MCHC RBC AUTO-ENTMCNC: 33 G/DL (ref 33–36)
MCV RBC AUTO: 78.3 FL (ref 80–94)
MONOCYTES # BLD AUTO: 1.58 X10(3)/MCL (ref 0.1–1.3)
MONOCYTES NFR BLD AUTO: 8.8 %
NEUTROPHILS # BLD AUTO: 11.99 X10(3)/MCL (ref 2.1–9.2)
NEUTROPHILS NFR BLD AUTO: 66.9 %
NITRITE UR QL STRIP: NEGATIVE
NRBC BLD AUTO-RTO: 0 %
PH UR STRIP: 6.5 [PH]
PLATELET # BLD AUTO: 314 X10(3)/MCL (ref 130–400)
PMV BLD AUTO: 9.6 FL (ref 7.4–10.4)
POTASSIUM SERPL-SCNC: 3.9 MMOL/L (ref 3.5–5.1)
PROT SERPL-MCNC: 8.6 GM/DL (ref 6.4–8.3)
PROT UR QL STRIP: NEGATIVE
RBC # BLD AUTO: 6 X10(6)/MCL (ref 4.7–6.1)
SODIUM SERPL-SCNC: 141 MMOL/L (ref 136–145)
SP GR UR STRIP.AUTO: 1.02 (ref 1–1.03)
UROBILINOGEN UR STRIP-ACNC: 0.2
WBC # BLD AUTO: 17.92 X10(3)/MCL (ref 4.5–11.5)

## 2024-10-20 PROCEDURE — 25500020 PHARM REV CODE 255: Performed by: EMERGENCY MEDICINE

## 2024-10-20 PROCEDURE — 80307 DRUG TEST PRSMV CHEM ANLYZR: CPT | Performed by: EMERGENCY MEDICINE

## 2024-10-20 PROCEDURE — 85025 COMPLETE CBC W/AUTO DIFF WBC: CPT | Performed by: EMERGENCY MEDICINE

## 2024-10-20 PROCEDURE — 99285 EMERGENCY DEPT VISIT HI MDM: CPT | Mod: 25

## 2024-10-20 PROCEDURE — 80053 COMPREHEN METABOLIC PANEL: CPT | Performed by: EMERGENCY MEDICINE

## 2024-10-20 PROCEDURE — 81003 URINALYSIS AUTO W/O SCOPE: CPT | Performed by: EMERGENCY MEDICINE

## 2024-10-20 PROCEDURE — 82272 OCCULT BLD FECES 1-3 TESTS: CPT | Performed by: EMERGENCY MEDICINE

## 2024-10-20 PROCEDURE — 83690 ASSAY OF LIPASE: CPT | Performed by: EMERGENCY MEDICINE

## 2024-10-20 RX ORDER — MORPHINE SULFATE 4 MG/ML
4 INJECTION, SOLUTION INTRAMUSCULAR; INTRAVENOUS
Status: COMPLETED | OUTPATIENT
Start: 2024-10-21 | End: 2024-10-21

## 2024-10-20 RX ORDER — PANTOPRAZOLE SODIUM 40 MG/10ML
40 INJECTION, POWDER, LYOPHILIZED, FOR SOLUTION INTRAVENOUS
Status: COMPLETED | OUTPATIENT
Start: 2024-10-21 | End: 2024-10-21

## 2024-10-20 RX ADMIN — IOHEXOL 100 ML: 350 INJECTION, SOLUTION INTRAVENOUS at 09:10

## 2024-10-21 LAB
ADV 40+41 DNA STL QL NAA+NON-PROBE: NOT DETECTED
AMPHET UR QL SCN: NEGATIVE
ASTRO TYP 1-8 RNA STL QL NAA+NON-PROBE: NOT DETECTED
BARBITURATE SCN PRESENT UR: NEGATIVE
BENZODIAZ UR QL SCN: NEGATIVE
C CAYETANENSIS DNA STL QL NAA+NON-PROBE: NOT DETECTED
C COLI+JEJ+UPSA DNA STL QL NAA+NON-PROBE: NOT DETECTED
CANNABINOIDS UR QL SCN: NEGATIVE
COCAINE UR QL SCN: NEGATIVE
COLOR STL: NORMAL
CONSISTENCY STL: NORMAL
CRYPTOSP DNA STL QL NAA+NON-PROBE: NOT DETECTED
E HISTOLYT DNA STL QL NAA+NON-PROBE: NOT DETECTED
EAEC PAA PLAS AGGR+AATA ST NAA+NON-PRB: NOT DETECTED
EC STX1+STX2 GENES STL QL NAA+NON-PROBE: NOT DETECTED
EPEC EAE GENE STL QL NAA+NON-PROBE: DETECTED
ETEC LTA+ST1A+ST1B TOX ST NAA+NON-PROBE: NOT DETECTED
FENTANYL UR QL SCN: NEGATIVE
G LAMBLIA DNA STL QL NAA+NON-PROBE: NOT DETECTED
HEMOCCULT SP1 STL QL: NEGATIVE
MDMA UR QL SCN: NEGATIVE
NOROVIRUS GI+II RNA STL QL NAA+NON-PROBE: NOT DETECTED
OPIATES UR QL SCN: NEGATIVE
P SHIGELLOIDES DNA STL QL NAA+NON-PROBE: NOT DETECTED
PCP UR QL: NEGATIVE
PH UR: 6.5 [PH] (ref 3–11)
RVA RNA STL QL NAA+NON-PROBE: NOT DETECTED
S ENT+BONG DNA STL QL NAA+NON-PROBE: NOT DETECTED
SAPO I+II+IV+V RNA STL QL NAA+NON-PROBE: NOT DETECTED
SHIGELLA SP+EIEC IPAH ST NAA+NON-PROBE: NOT DETECTED
SPECIFIC GRAVITY, URINE AUTO (.000) (OHS): 1.02 (ref 1–1.03)
V CHOL+PARA+VUL DNA STL QL NAA+NON-PROBE: NOT DETECTED
V CHOLERAE DNA STL QL NAA+NON-PROBE: NOT DETECTED
Y ENTEROCOL DNA STL QL NAA+NON-PROBE: NOT DETECTED

## 2024-10-21 PROCEDURE — 86318 IA INFECTIOUS AGENT ANTIBODY: CPT

## 2024-10-21 PROCEDURE — 87045 FECES CULTURE AEROBIC BACT: CPT | Performed by: STUDENT IN AN ORGANIZED HEALTH CARE EDUCATION/TRAINING PROGRAM

## 2024-10-21 PROCEDURE — 96375 TX/PRO/DX INJ NEW DRUG ADDON: CPT

## 2024-10-21 PROCEDURE — G0378 HOSPITAL OBSERVATION PER HR: HCPCS

## 2024-10-21 PROCEDURE — 63600175 PHARM REV CODE 636 W HCPCS: Performed by: NURSE PRACTITIONER

## 2024-10-21 PROCEDURE — 25000003 PHARM REV CODE 250: Performed by: NURSE PRACTITIONER

## 2024-10-21 PROCEDURE — 96374 THER/PROPH/DIAG INJ IV PUSH: CPT

## 2024-10-21 PROCEDURE — 63600175 PHARM REV CODE 636 W HCPCS: Performed by: EMERGENCY MEDICINE

## 2024-10-21 PROCEDURE — 87077 CULTURE AEROBIC IDENTIFY: CPT | Performed by: STUDENT IN AN ORGANIZED HEALTH CARE EDUCATION/TRAINING PROGRAM

## 2024-10-21 PROCEDURE — 87507 IADNA-DNA/RNA PROBE TQ 12-25: CPT

## 2024-10-21 PROCEDURE — 96376 TX/PRO/DX INJ SAME DRUG ADON: CPT

## 2024-10-21 PROCEDURE — 96361 HYDRATE IV INFUSION ADD-ON: CPT

## 2024-10-21 PROCEDURE — 82270 OCCULT BLOOD FECES: CPT | Performed by: EMERGENCY MEDICINE

## 2024-10-21 RX ORDER — NALOXONE HCL 0.4 MG/ML
0.02 VIAL (ML) INJECTION
Status: DISCONTINUED | OUTPATIENT
Start: 2024-10-21 | End: 2024-10-22 | Stop reason: HOSPADM

## 2024-10-21 RX ORDER — PROCHLORPERAZINE EDISYLATE 5 MG/ML
5 INJECTION INTRAMUSCULAR; INTRAVENOUS EVERY 6 HOURS PRN
Status: DISCONTINUED | OUTPATIENT
Start: 2024-10-21 | End: 2024-10-22 | Stop reason: HOSPADM

## 2024-10-21 RX ORDER — AZITHROMYCIN 250 MG/1
500 TABLET, FILM COATED ORAL DAILY
Status: DISCONTINUED | OUTPATIENT
Start: 2024-10-22 | End: 2024-10-22 | Stop reason: HOSPADM

## 2024-10-21 RX ORDER — IBUPROFEN 200 MG
24 TABLET ORAL
Status: DISCONTINUED | OUTPATIENT
Start: 2024-10-21 | End: 2024-10-22 | Stop reason: HOSPADM

## 2024-10-21 RX ORDER — SODIUM CHLORIDE 9 MG/ML
INJECTION, SOLUTION INTRAVENOUS CONTINUOUS
Status: DISCONTINUED | OUTPATIENT
Start: 2024-10-21 | End: 2024-10-21

## 2024-10-21 RX ORDER — PANTOPRAZOLE SODIUM 40 MG/10ML
40 INJECTION, POWDER, LYOPHILIZED, FOR SOLUTION INTRAVENOUS DAILY
Status: DISCONTINUED | OUTPATIENT
Start: 2024-10-21 | End: 2024-10-22 | Stop reason: HOSPADM

## 2024-10-21 RX ORDER — GLUCAGON 1 MG
1 KIT INJECTION
Status: DISCONTINUED | OUTPATIENT
Start: 2024-10-21 | End: 2024-10-22 | Stop reason: HOSPADM

## 2024-10-21 RX ORDER — ONDANSETRON HYDROCHLORIDE 2 MG/ML
4 INJECTION, SOLUTION INTRAVENOUS EVERY 4 HOURS PRN
Status: DISCONTINUED | OUTPATIENT
Start: 2024-10-21 | End: 2024-10-22 | Stop reason: HOSPADM

## 2024-10-21 RX ORDER — ACETAMINOPHEN 325 MG/1
650 TABLET ORAL EVERY 4 HOURS PRN
Status: DISCONTINUED | OUTPATIENT
Start: 2024-10-21 | End: 2024-10-22 | Stop reason: HOSPADM

## 2024-10-21 RX ORDER — ACETAMINOPHEN 500 MG
1000 TABLET ORAL EVERY 6 HOURS PRN
Status: DISCONTINUED | OUTPATIENT
Start: 2024-10-21 | End: 2024-10-22 | Stop reason: HOSPADM

## 2024-10-21 RX ORDER — SODIUM CHLORIDE 0.9 % (FLUSH) 0.9 %
10 SYRINGE (ML) INJECTION
Status: DISCONTINUED | OUTPATIENT
Start: 2024-10-21 | End: 2024-10-22 | Stop reason: HOSPADM

## 2024-10-21 RX ORDER — IBUPROFEN 200 MG
16 TABLET ORAL
Status: DISCONTINUED | OUTPATIENT
Start: 2024-10-21 | End: 2024-10-22 | Stop reason: HOSPADM

## 2024-10-21 RX ADMIN — PANTOPRAZOLE SODIUM 40 MG: 40 INJECTION, POWDER, LYOPHILIZED, FOR SOLUTION INTRAVENOUS at 11:10

## 2024-10-21 RX ADMIN — PANTOPRAZOLE SODIUM 40 MG: 40 INJECTION, POWDER, LYOPHILIZED, FOR SOLUTION INTRAVENOUS at 12:10

## 2024-10-21 RX ADMIN — MORPHINE SULFATE 4 MG: 4 INJECTION INTRAVENOUS at 12:10

## 2024-10-21 RX ADMIN — SODIUM CHLORIDE: 9 INJECTION, SOLUTION INTRAVENOUS at 09:10

## 2024-10-21 NOTE — H&P
[facility] Hospital Medicine History & Physical Examination       Patient Name: Rashaad Jj  MRN: [id number]  Patient Class: OP- Observation   Admission Date: 10/20/2024   Admitting Physician: CLAUDIA Service   Length of Stay: 0  Attending Physician: Dr Austin Bruno  Primary Care Provider: non-staff  Face-to-Face encounter date: 10/21/2024  Code Status:Full code  Chief Complaint: Abdominal Pain (Rectal bleeding; dark red and black stools reported)        Screening for Social Drivers for health:  Patient screened for food insecurity, housing instability, transportation needs, utility difficulties, and interpersonal safety (select all that apply as identified as concern)  []Housing or Food  []Transportation Needs  []Utility Difficulties  []Interpersonal safety  [x]None    Patient information was obtained from patient, patient's family, past medical records and/or ER records.     HISTORY OF PRESENT ILLNESS:   Rashaad Jj is a 34 y.o. male who PMH includes HTN, GERD, sciatica; presents to the ED at [facility]  on 10/20/2024 with reports of lower abdominal pain with associated bright red bleeding per rectum with associated dark/black stools.  Patient reported lower abdominal pain primarily on the left side with associated nausea, denies any vomiting.  Patient has had multiple ED visits secondary to these complaints which he was prescribed Carafate and Protonix.  No reports of any other new foods or medications.  Patient denies frequent NSAID or aspirin use or BC powder use.  No reports of any endoscopy done in the past.  Lab work reviewed demonstrated WBCs 17.92, BUN 21.3, creatinine 1.14, glucose 123, other indices unremarkable.  CT of the abdomen and pelvis with and without contrast impression reviewed demonstrated no acute abnormality of the abdomen and pelvis, no active extravasation, hepatic steatosis.  Initial vital signs /91 pulse 98 respirations 20 temperature  98.2° F O2 saturation 99% on room air.  Patient did receive PPI therapy.  Patient was transferred from [facility] and admitted to [facility] for high-level care and GI services.  GI Services were consulted.  Patient is admitted to hospital medicine services for further management.   med use    PAST MEDICAL HISTORY:     Past Medical History:   Diagnosis Date    GERD (gastroesophageal reflux disease)     Hypertension     Sciatica        PAST SURGICAL HISTORY:   History reviewed. No pertinent surgical history.    ALLERGIES:   Patient has no known allergies.    FAMILY HISTORY:   Reviewed and negative    SOCIAL HISTORY:   Denies any alcohol, tobacco, or illicit drug use   Lives with family     HOME MEDICATIONS:   As documented  Prior to Admission medications    Medication Sig Start Date End Date Taking? Authorizing Provider   baclofen (LIORESAL) 10 MG tablet Take 1 tablet (10 mg total) by mouth 3 (three) times daily as needed (muscle spasms). 1/9/23 1/16/23  Luisito Ashton Jr., ANGIE   pantoprazole (PROTONIX) 40 MG tablet Take 1 tablet (40 mg total) by mouth once daily. 10/12/24 10/12/25  Marshall Deng MD   sucralfate (CARAFATE) 1 gram tablet Take 1 tablet (1 g total) by mouth 2 (two) times daily. 10/12/24   Marshall Deng MD       REVIEW OF SYSTEMS:   Except as documented, all other systems reviewed and negative     PHYSICAL EXAM:     VITAL SIGNS: 24 HRS MIN & MAX LAST   Temp  Min: 97.9 °F (36.6 °C)  Max: 98.2 °F (36.8 °C) 97.9 °F (36.6 °C)   BP  Min: 126/89  Max: 156/97 137/88   Pulse  Min: 93  Max: 114  93   Resp  Min: 17  Max: 28 20   SpO2  Min: 94 %  Max: 99 % 95 %       General appearance: Well-developed, well-nourished male looks stated age; reports mild abdominal complaints, family member at the bedside; translation per med used  HENT: Atraumatic head. Moist mucous membranes of oral cavity.  Eyes: PERRL  Lungs: Clear to auscultation bilaterally. No wheezing present.   Heart: Regular rate and rhythm. S1  and S2 present with no murmurs/gallop/rub. No pedal edema. No JVD present.   Abdomen: Soft, obese, non-distended, generalized TTP, no rebound tenderness/guarding. Bowel sounds are normal.   Extremities: No cyanosis, clubbing  Skin: warm and dry  Neuro:  Oriented x3, no acute focal deficits  Psych/mental status: cooperative    LABS AND IMAGING:     Recent Labs   Lab 10/20/24  2025   WBC 17.92*   RBC 6.00   HGB 15.5   HCT 47.0   MCV 78.3*   MCH 25.8*   MCHC 33.0   RDW 14.2      MPV 9.6       Recent Labs   Lab 10/20/24  2025      K 3.9      CO2 26   BUN 21.3*   CREATININE 1.14   CALCIUM 10.2   ALBUMIN 4.6   ALKPHOS 65   ALT 64*   AST 27   BILITOT 0.6       Microbiology Results (last 7 days)       ** No results found for the last 168 hours. **             CT Abdomen Pelvis W Wo Contrast  Narrative: EXAMINATION:  CT ABDOMEN PELVIS W WO CONTRAST    CLINICAL HISTORY:  GI bleed;    TECHNIQUE:  Axial images of the abdomen and pelvis were obtained with and without IV contrast administration.  Coronal and sagittal reconstructions were provided.  Three dimensional and MIP images were obtained and evaluated.  Total DLP was 3233 mGy-cm. Dose lowering technique and automated exposure control were utilized for this exam.    COMPARISON:  CT of the abdomen and pelvis 10/01/2024.    FINDINGS:  The bilateral lung bases are clear.  The heart is normal in size.    There is diffuse fatty infiltration of the liver.  The portal vein is patent.  The gallbladder, spleen, pancreas, and adrenal glands are normal.  The bilateral kidneys are normal.  There is no hydronephrosis or nephrolithiasis.    The stomach and small bowel are decompressed.  There is no bowel obstruction.  The colon is decompressed.  The appendix is normal.  There is no active contrast extravasation throughout the GI tract.    The urinary bladder is normal.  There is no pelvic or retroperitoneal adenopathy.  The aorta is nonaneurysmal.  There is no lytic or  blastic osseous lesion.  Impression: 1. No acute abnormality of the abdomen and pelvis.  There is no active extravasation.  2. Hepatic steatosis.  [employer] concordance    Electronically signed by: Darius Garcia MD  Date:    10/21/2024  Time:    06:09        ASSESSMENT & PLAN:   ASSESSMENT:  Acute GI bleed-lower suspected-POA   Bright red bleeding per rectum-POA   Leukocytosis-likely stress response-POA   Elevated liver enzymes-hepatic steatosis noted on imaging-POA   Diarrhea-POA   Nausea without vomiting-POA   Weakness-POA      PLAN:  Consult GI Services appreciate assistance and recommendations   Continue with PPI therapy   Gentle IV hydration   Keep NPO status until patient is evaluated by GI Services   Avoid anticoagulation therapy  No NSAIDs or Rogers 2 inhibitors   Serial H&H   Repeat lab work in a.m.      VTE Prophylaxis: SCD for DVT prophylaxis and will be advised to be as mobile as possible and sit in a chair as tolerated    Patient condition:  Stable    __________________________________________________________________________  INPATIENT LIST OF MEDICATIONS     Scheduled Meds:   pantoprazole  40 mg Intravenous Daily     Continuous Infusions:   0.9% NaCl   Intravenous Continuous         PRN Meds:.  Current Facility-Administered Medications:     acetaminophen, 1,000 mg, Oral, Q6H PRN    acetaminophen, 650 mg, Oral, Q4H PRN    dextrose 10%, 12.5 g, Intravenous, PRN    dextrose 10%, 25 g, Intravenous, PRN    glucagon (human recombinant), 1 mg, Intramuscular, PRN    glucose, 16 g, Oral, PRN    glucose, 24 g, Oral, PRN    naloxone, 0.02 mg, Intravenous, PRN    ondansetron, 4 mg, Intravenous, Q4H PRN    prochlorperazine, 5 mg, Intravenous, Q6H PRN    sodium chloride 0.9%, 10 mL, Intravenous, PRN      I, ACE Ayala have reviewed and discussed the case with   Dr. Austin Bruno.  Please see the following addendum for further assessment and plan from their attending MD.  ACE Bentley    10/21/2024    Dr. Bruno 10/21/2024-patient is a 34-year-old  male from [place] with complaints of left lower quadrant associated with episodes of diarrhea.  He refers he has had bright red blood per rectum at times.  Patient was seen by GI, GI PCR was done and pertinent for enteropathogenic E coli placed on azithromycin 500 mg p.o. daily for 3 days.  Patient with a history of hypertension with his primary care physician in [place] Dr. Jenkins.  Patient refers no nausea/vomiting.  We will feet and he tolerates p.o. antibiotics we will discharge in a.m..  Agree with the assessment and plans done by nurse practitioner Ms. Rj Russell.  Corrections were done to HPI/physical examination assessment and plans at the time of my evaluation    Discharge Planning and Disposition: No mobility needs. Ambulating well. Good social support system.   Anticipated discharge    All diagnosis and differential diagnosis have been reviewed; assessment and plan has been documented; I have personally reviewed the labs and test results that are presently available; I have reviewed the patients medication list; I have reviewed the consulting providers response and recommendations. I have reviewed or attempted to review medical records based upon their availability.    All of the patient and family questions have been addressed and answered. Patient's is agreeable to the above stated plan. I will continue to monitor closely and make adjustments to medical management as needed.    France BLACKWELL  HIM  10/21/24

## 2024-10-21 NOTE — ED PROVIDER NOTES
Encounter Date: 10/20/2024       History     Chief Complaint   Patient presents with    Abdominal Pain     Rectal bleeding; dark red and black stools reported     34-year-old male history of hypertension presents with left upper quadrant abdominal pain which radiates to his left lower quadrant.  Patient says it feels like a toothache.  Eating makes it worse.  Patient has had this abdominal pain for 2 months he has been seen in the ER multiple times he is currently taking Carafate and Protonix.  He returns today because yesterday he noticed blood in his stool as well as black stool.  Last bowel movement was at noon today in his described as black.  No Pepto-Bismol or iron pills.  +nausea but no vomiting.  No diarrhea constipation    The history is provided by the patient.     Review of patient's allergies indicates:  No Known Allergies  Past Medical History:   Diagnosis Date    GERD (gastroesophageal reflux disease)     Hypertension     Sciatica      History reviewed. No pertinent surgical history.  No family history on file.  Social History     Tobacco Use    Smoking status: Never    Smokeless tobacco: Never   Substance Use Topics    Alcohol use: Never    Drug use: Never     Review of Systems   Constitutional:  Negative for chills, diaphoresis, fatigue and fever.   HENT:  Negative for congestion, drooling, ear discharge, ear pain, postnasal drip, rhinorrhea, sinus pressure, sinus pain, sore throat and tinnitus.    Eyes:  Negative for discharge.   Respiratory:  Negative for cough, chest tightness, shortness of breath and wheezing.    Cardiovascular:  Negative for chest pain and palpitations.   Gastrointestinal:  Positive for abdominal pain, blood in stool and nausea. Negative for diarrhea, rectal pain and vomiting.   Genitourinary:  Negative for frequency, hematuria and urgency.   Musculoskeletal:  Negative for back pain, neck pain and neck stiffness.   Skin:  Negative for rash.   Neurological:  Negative for syncope,  weakness, light-headedness, numbness and headaches.   Psychiatric/Behavioral:  Negative for suicidal ideas.        Physical Exam     Initial Vitals [10/20/24 1948]   BP Pulse Resp Temp SpO2   (!) 128/91 98 20 98.2 °F (36.8 °C) 99 %      MAP       --         Physical Exam    Nursing note and vitals reviewed.  Constitutional: He appears well-developed and well-nourished. No distress.   HENT: Mouth/Throat: Oropharynx is clear and moist.   Eyes: Conjunctivae are normal.   Cardiovascular:  Normal rate.           Pulmonary/Chest: Breath sounds normal. No respiratory distress.   Abdominal: Abdomen is soft and flat. There is abdominal tenderness in the left upper quadrant and left lower quadrant. There is no guarding.   Musculoskeletal:         General: Normal range of motion.     Neurological: He is alert and oriented to person, place, and time.   Skin: Skin is warm and dry.         ED Course   Procedures  Labs Reviewed   COMPREHENSIVE METABOLIC PANEL - Abnormal       Result Value    Sodium 141      Potassium 3.9      Chloride 102      CO2 26      Glucose 123 (*)     Blood Urea Nitrogen 21.3 (*)     Creatinine 1.14      Calcium 10.2      Protein Total 8.6 (*)     Albumin 4.6      Globulin 4.0 (*)     Albumin/Globulin Ratio 1.2      Bilirubin Total 0.6      ALP 65      ALT 64 (*)     AST 27      eGFR >60      Anion Gap 13.0      BUN/Creatinine Ratio 19     CBC WITH DIFFERENTIAL - Abnormal    WBC 17.92 (*)     RBC 6.00      Hgb 15.5      Hct 47.0      MCV 78.3 (*)     MCH 25.8 (*)     MCHC 33.0      RDW 14.2      Platelet 314      MPV 9.6      Neut % 66.9      Lymph % 23.1      Mono % 8.8      Eos % 0.1      Basophil % 0.3      Lymph # 4.14      Neut # 11.99 (*)     Mono # 1.58 (*)     Eos # 0.02      Baso # 0.05      IG# 0.14 (*)     IG% 0.8      NRBC% 0.0     OCCULT BLOOD, STOOL 1ST SPECIMEN - Normal    Occult Blood Stool 1 Negative     LIPASE - Normal    Lipase Level 30     URINALYSIS, REFLEX TO URINE CULTURE - Normal     Color, UA Straw      Appearance, UA Clear      Specific Gravity, UA 1.020      pH, UA 6.5      Protein, UA Negative      Glucose, UA Negative      Ketones, UA Negative      Blood, UA Negative      Bilirubin, UA Negative      Urobilinogen, UA 0.2      Nitrites, UA Negative      Leukocyte Esterase, UA Negative     CBC W/ AUTO DIFFERENTIAL    Narrative:     The following orders were created for panel order CBC auto differential.  Procedure                               Abnormality         Status                     ---------                               -----------         ------                     CBC with Differential[[id number]]       Abnormal            Final result                 Please view results for these tests on the individual orders.   DRUG SCREEN, URINE (BEAKER)          Imaging Results              CT Abdomen Pelvis W Wo Contrast (Preliminary result)  Result time 10/20/24 21:47:05      Preliminary result by Rich Hinds MD (10/20/24 21:47:05)                   Narrative:    START OF REPORT:  Technique: CT of the abdomen and pelvis was performed with axial images as well as sagittal and coronal reconstruction images without and with intravenous contrast.    Comparison: Comparison is with study dated 2024-10-01 18:10:30.    Clinical History: Rectal bleeding; dark red and black stools reported.    Dosage Information: Automated Exposure Control was utilized 3232.63 mGy.cm.    Findings:  Lines and Tubes: None.  Thorax:  Lungs: The visualized lung bases appear unremarkable.  Pleura: No effusions or thickening. No pneumothorax is seen.  Heart: The heart size is within normal limits.  Abdomen:  Abdominal Wall: No abdominal wall pathology is seen.  Liver: The liver appears unremarkable.  Biliary System: No intrahepatic or extrahepatic biliary duct dilatation is seen.  Gallbladder: The gallbladder appears unremarkable.  Pancreas: The pancreas appears unremarkable.  Spleen: The spleen appears  unremarkable.  Adrenals: The adrenal glands appear unremarkable.  Kidneys: The kidneys appear unremarkable with no stones cysts masses or hydronephrosis.  Aorta: The abdominal aorta appears unremarkable.  IVC: Unremarkable.  Bowel:  Esophagus: The visualized distal esophagus appears unremarkable.  Stomach: The stomach appears unremarkable.  Duodenum: Unremarkable appearing duodenum.  Small Bowel: The small bowel appears unremarkable.  Colon: Nondistended. A few diverticula are seen in the hepatic flexure. No associated inflammatory stranding or pericolonic fluid is seen to suggest diverticulitis.  Appendix: The appendix appears unremarkable and is seen on Image 101, Series 2.  Peritoneum: No intraperitoneal free air or ascites is seen.    Pelvis:  Bladder: The bladder is nondistended but appears otherwise unremarkable.  Male:  Prostate gland: The prostate gland appears unremarkable.    Bony structures:  Dorsal Spine: There is mild spondylosis of the visualized dorsal spine.  Bony Pelvis: The visualized bony structures of the pelvis appear unremarkable.      Impression:  1. No acute intraabdominal or pelvic pathology is identified. Details and other findings as discussed above.                                         Medications   iohexoL (OMNIPAQUE 350) injection 100 mL (100 mLs Intravenous Given 10/20/24 2121)   pantoprazole injection 40 mg (40 mg Intravenous Given 10/21/24 0003)   morphine injection 4 mg (4 mg Intravenous Given 10/21/24 0004)     Medical Decision Making  Medical Decision Making  Problem list/ differential diagnosis including but not limited to:  PEPTIC ULCER DISEASE, GASTRITIS/ESOPHAGITIS, ESOPHAGEAL VARICES, REE-MOODY TEAR, MALIGNANCY, AORTOENTERIC FISTULA, BOERHAAVE,, ANGIODYSPLASIA, HEMOPTYSIS, NOSEBLEED, IRON/PEPTO-BISMOL/BEATS     Patient's chronic illnesses impacting care:  none    Diagnostic test considered but not ordered:    My interpretations:      Radiology reports      Discussion  of case with external qualified healthcare professionals:  Not applicable    Review of external notes( inpt, ems, NH, clinic):      Decision rules/scores:    Medications reviewed:  Medications ordered in the ER:  Protonix, morphine  Discharge prescriptions:    Social variables possible impacting patient's healthcare:    Code status/discussion    Shared decision making:    Consideration for admission versus discharge:  Admit as an observation    Amount and/or Complexity of Data Reviewed  Labs: ordered. Decision-making details documented in ED Course.  Radiology: ordered.    Risk  Prescription drug management.               ED Course as of 10/21/24 0006   Sun Oct 20, 2024   2327 Occult Blood: Negative [WC]   2327 Lipase: 30 [WC]   2328 RBC: 6.00 [WC]   2328 Hemoglobin: 15.5  UA unremarkable [WC]   2329 Hemoglobin: 15.5 [WC]      ED Course User Index  [WC] Marshall Deng MD                           Clinical Impression:  Final diagnoses:  [R10.12] Left upper quadrant abdominal pain (Primary)          ED Disposition Condition    Observation                 Marshall Deng MD  10/21/24 0006

## 2024-10-21 NOTE — CONSULTS
Consult Note    Reason for Consult:      We were consulted by Dr. Sun to evaluate this patient for abdominal pain / rectal bleeding.     HPI:     34 year old male unknown to our group with a PMHx of HTN and acid reflux presented to the ER 10/20/24 complaining of LUQ abdominal pain which radiates to the LLQ x 2 months.  Also reports 2 day history of dark red / black stools with nausea, no vomiting.       Of note, patient has had multiple ER visits for the same complaint over the last 3 weeks.  Currently on carafate and protonix.      On arrival to the ER, patient afebrile and HDS.  Lab work significant for WBC 17.92, BUN 21.3, ALT 64.  UDS and UA negative.  Stool negative for occult blood.  CT A/P w/wout contrast noted diffuse fatty infiltration of the liver.  Otherwise, no acute abnormality.   Admitted to hospital medicine for further management.  GI has been consulted for abdominal pain and rectal bleeding.      Patient seen sitting up in bed, family member at bedside.  Afebrile and HDS.  He denies any abdominal pain, nausea, or vomiting at present.  Patient states he has been having left sided abdominal pain for about 2 months now.  Worse with food intake.  Also reports associated nausea, without vomiting along with diarrhea.  Patient reports episode of BRBPR on Saturday and dark red / black stool on Sunday.  Today, patient states stools are more yellow / brown and watery.    Patient denies frequent NSAID use.  Also denies alcohol, tobacco, or illicit drug use.  Currently on protonix and carafate at home.  No history of prior endoscopies.     Previous records reviewed. Collateral information obtained from family member present at bedside.    PCP:  No, Primary Doctor    Review of patient's allergies indicates:  No Known Allergies     Current Facility-Administered Medications   Medication Dose Route Frequency Provider Last Rate Last Admin    0.9%  NaCl infusion   Intravenous Continuous Garrison Adams FNP  100 mL/hr at 10/21/24 0917 New Bag at 10/21/24 0917    acetaminophen tablet 1,000 mg  1,000 mg Oral Q6H PRN Garrison Adams, FNP        acetaminophen tablet 650 mg  650 mg Oral Q4H PRN Garrison Adams, FNP        dextrose 10% bolus 125 mL 125 mL  12.5 g Intravenous PRN Garrison Adams, FNP        dextrose 10% bolus 250 mL 250 mL  25 g Intravenous PRN Garrison Adams, FNP        glucagon (human recombinant) injection 1 mg  1 mg Intramuscular PRN Garrison Adams, FNP        glucose chewable tablet 16 g  16 g Oral PRN Garrison Adams A, FNP        glucose chewable tablet 24 g  24 g Oral PRN Garrison Adams, FNP        naloxone 0.4 mg/mL injection 0.02 mg  0.02 mg Intravenous PRN Garrison Adams, FNP        ondansetron injection 4 mg  4 mg Intravenous Q4H PRN Yarelis Merritt, FNP        pantoprazole injection 40 mg  40 mg Intravenous Daily Yarelis Merritt, FNP   40 mg at 10/21/24 1149    prochlorperazine injection Soln 5 mg  5 mg Intravenous Q6H PRN Garrison Adams, FNP        sodium chloride 0.9% flush 10 mL  10 mL Intravenous PRN Garrison Adams, ANGIEP         Medications Prior to Admission   Medication Sig Dispense Refill Last Dose/Taking    baclofen (LIORESAL) 10 MG tablet Take 1 tablet (10 mg total) by mouth 3 (three) times daily as needed (muscle spasms). 21 tablet 0     pantoprazole (PROTONIX) 40 MG tablet Take 1 tablet (40 mg total) by mouth once daily. 90 tablet 3     sucralfate (CARAFATE) 1 gram tablet Take 1 tablet (1 g total) by mouth 2 (two) times daily. 30 tablet 0        Past Medical History:  Past Medical History:   Diagnosis Date    GERD (gastroesophageal reflux disease)     Hypertension     Sciatica       Past Surgical History:  History reviewed. No pertinent surgical history.   Family History:  No family history on file.  Social History:  Social History     Tobacco Use    Smoking status: Never    Smokeless tobacco: Never   Substance  Use Topics    Alcohol use: Never       Review of Systems:     Review of Systems   Constitutional:  Negative for activity change, appetite change, fever and unexpected weight change.   HENT:  Negative for trouble swallowing.    Gastrointestinal:  Positive for abdominal pain (left sided), blood in stool (resolved), diarrhea and nausea (intermittent). Negative for abdominal distention, anal bleeding, constipation, rectal pain and vomiting.   Musculoskeletal:  Negative for arthralgias and myalgias.   Neurological:  Negative for dizziness, weakness and light-headedness.       Objective:     VITAL SIGNS: 24 HR MIN & MAX LAST    Temp  Min: 97.9 °F (36.6 °C)  Max: 98.2 °F (36.8 °C)  98 °F (36.7 °C)        BP  Min: 126/89  Max: 156/97  (!) 138/90     Pulse  Min: 86  Max: 114  86     Resp  Min: 16  Max: 28  16    SpO2  Min: 94 %  Max: 99 %  95 %      No intake or output data in the 24 hours ending 10/21/24 1245    Physical Exam  Constitutional:       General: He is not in acute distress.     Appearance: He is obese. He is not ill-appearing.   HENT:      Head: Normocephalic and atraumatic.      Mouth/Throat:      Mouth: Mucous membranes are moist.      Pharynx: Oropharynx is clear.   Eyes:      Extraocular Movements: Extraocular movements intact.      Pupils: Pupils are equal, round, and reactive to light.   Cardiovascular:      Rate and Rhythm: Normal rate and regular rhythm.      Pulses: Normal pulses.      Heart sounds: Normal heart sounds. No murmur heard.     No friction rub. No gallop.   Pulmonary:      Effort: Pulmonary effort is normal. No respiratory distress.      Breath sounds: Normal breath sounds. No wheezing, rhonchi or rales.   Abdominal:      General: Bowel sounds are normal. There is no distension.      Palpations: Abdomen is soft.      Tenderness: There is no abdominal tenderness. There is no guarding.   Musculoskeletal:      Cervical back: Normal range of motion and neck supple.      Right lower leg: No  edema.      Left lower leg: No edema.   Skin:     General: Skin is warm and dry.   Neurological:      General: No focal deficit present.      Mental Status: He is alert and oriented to person, place, and time.   Psychiatric:         Mood and Affect: Mood normal.         Behavior: Behavior normal.         Thought Content: Thought content normal.         Judgment: Judgment normal.           Recent Results (from the past 48 hours)   Occult Blood, Stool 1st Specimen    Collection Time: 10/20/24  8:25 PM   Result Value Ref Range    Occult Blood Stool 1 Negative Negative   Comprehensive metabolic panel    Collection Time: 10/20/24  8:25 PM   Result Value Ref Range    Sodium 141 136 - 145 mmol/L    Potassium 3.9 3.5 - 5.1 mmol/L    Chloride 102 98 - 107 mmol/L    CO2 26 22 - 29 mmol/L    Glucose 123 (H) 74 - 100 mg/dL    Blood Urea Nitrogen 21.3 (H) 8.9 - 20.6 mg/dL    Creatinine 1.14 0.72 - 1.25 mg/dL    Calcium 10.2 8.4 - 10.2 mg/dL    Protein Total 8.6 (H) 6.4 - 8.3 gm/dL    Albumin 4.6 3.5 - 5.0 g/dL    Globulin 4.0 (H) 2.4 - 3.5 gm/dL    Albumin/Globulin Ratio 1.2 1.1 - 2.0 ratio    Bilirubin Total 0.6 <=1.5 mg/dL    ALP 65 40 - 150 unit/L    ALT 64 (H) 0 - 55 unit/L    AST 27 5 - 34 unit/L    eGFR >60 mL/min/1.73/m2    Anion Gap 13.0 mEq/L    BUN/Creatinine Ratio 19    Lipase    Collection Time: 10/20/24  8:25 PM   Result Value Ref Range    Lipase Level 30 <=60 U/L   CBC with Differential    Collection Time: 10/20/24  8:25 PM   Result Value Ref Range    WBC 17.92 (H) 4.50 - 11.50 x10(3)/mcL    RBC 6.00 4.70 - 6.10 x10(6)/mcL    Hgb 15.5 14.0 - 18.0 g/dL    Hct 47.0 42.0 - 52.0 %    MCV 78.3 (L) 80.0 - 94.0 fL    MCH 25.8 (L) 27.0 - 31.0 pg    MCHC 33.0 33.0 - 36.0 g/dL    RDW 14.2 11.5 - 17.0 %    Platelet 314 130 - 400 x10(3)/mcL    MPV 9.6 7.4 - 10.4 fL    Neut % 66.9 %    Lymph % 23.1 %    Mono % 8.8 %    Eos % 0.1 %    Basophil % 0.3 %    Lymph # 4.14 0.6 - 4.6 x10(3)/mcL    Neut # 11.99 (H) 2.1 - 9.2 x10(3)/mcL     Mono # 1.58 (H) 0.1 - 1.3 x10(3)/mcL    Eos # 0.02 0 - 0.9 x10(3)/mcL    Baso # 0.05 <=0.2 x10(3)/mcL    IG# 0.14 (H) 0 - 0.04 x10(3)/mcL    IG% 0.8 %    NRBC% 0.0 %   Urinalysis, Reflex to Urine Culture    Collection Time: 10/20/24  8:25 PM    Specimen: Urine   Result Value Ref Range    Color, UA Straw Yellow, Light-Yellow, Dark Yellow, Diana, Straw    Appearance, UA Clear Clear    Specific Gravity, UA 1.020 1.005 - 1.030    pH, UA 6.5 5.0 - 8.5    Protein, UA Negative Negative    Glucose, UA Negative Negative, Normal    Ketones, UA Negative Negative    Blood, UA Negative Negative    Bilirubin, UA Negative Negative    Urobilinogen, UA 0.2 0.2, 1.0, Normal    Nitrites, UA Negative Negative    Leukocyte Esterase, UA Negative Negative   Drug Screen, Urine    Collection Time: 10/20/24  8:25 PM   Result Value Ref Range    Amphetamines, Urine Negative Negative    Barbiturates, Urine Negative Negative    Benzodiazepine, Urine Negative Negative    Cannabinoids, Urine Negative Negative    Cocaine, Urine Negative Negative    Fentanyl, Urine Negative Negative    MDMA, Urine Negative Negative    Opiates, Urine Negative Negative    Phencyclidine, Urine Negative Negative    pH, Urine 6.5 3.0 - 11.0    Specific Gravity, Urine Auto 1.020 1.001 - 1.035       CT Abdomen Pelvis W Wo Contrast    Result Date: 10/21/2024  EXAMINATION: CT ABDOMEN PELVIS W WO CONTRAST CLINICAL HISTORY: GI bleed; TECHNIQUE: Axial images of the abdomen and pelvis were obtained with and without IV contrast administration.  Coronal and sagittal reconstructions were provided.  Three dimensional and MIP images were obtained and evaluated.  Total DLP was 3233 mGy-cm. Dose lowering technique and automated exposure control were utilized for this exam. COMPARISON: CT of the abdomen and pelvis 10/01/2024. FINDINGS: The bilateral lung bases are clear.  The heart is normal in size. There is diffuse fatty infiltration of the liver.  The portal vein is patent.  The  gallbladder, spleen, pancreas, and adrenal glands are normal.  The bilateral kidneys are normal.  There is no hydronephrosis or nephrolithiasis. The stomach and small bowel are decompressed.  There is no bowel obstruction.  The colon is decompressed.  The appendix is normal.  There is no active contrast extravasation throughout the GI tract. The urinary bladder is normal.  There is no pelvic or retroperitoneal adenopathy.  The aorta is nonaneurysmal.  There is no lytic or blastic osseous lesion.     1. No acute abnormality of the abdomen and pelvis.  There is no active extravasation. 2. Hepatic steatosis. [employer] concordance Electronically signed by: Darius Garcia MD Date:    10/21/2024 Time:    06:09    CT Abdomen Pelvis With IV Contrast NO Oral Contrast    Result Date: 10/1/2024  EXAMINATION: CT ABDOMEN PELVIS WITH IV CONTRAST CLINICAL HISTORY: Abdominal abscess/infection suspected; TECHNIQUE: Low dose axial images, sagittal and coronal reformations were obtained from the lung bases to the pubic symphysis following the IV administration of contrast. Automatic exposure control (AEC) is utilized to reduce patient radiation exposure. COMPARISON: 01/16/2019 FINDINGS: The lung bases are clear. The liver appears normal.  No liver mass or lesion is seen.  Portal and hepatic veins appear normal. The gallbladder appears normal.  No obvious gallstones are seen.  No biliary dilatation is seen.  No pericholecystic fluid is seen. The pancreas appears normal.  No pancreatic mass or lesion is seen. The spleen shows no acute abnormality. The adrenal glands appear normal.  No adrenal nodule is seen. The kidneys appear normal.  No hydronephrosis is seen.  No hydroureter is seen.  No nephrolithiasis is seen.  No obvious ureteral stones are seen. Urinary bladder appears grossly unremarkable. No colitis is seen.  No diverticulitis is seen.  No obvious colonic mass or lesion is seen.  The appendix appears normal No free air is seen.  No  free fluid is seen.     No abnormality seen Electronically signed by: Royal Allen Date:    10/01/2024 Time:    18:28      Imaging personally reviewed by myself and SP.    Assessment / Plan:     34 year old male unknown to our group with a PMHx of HTN and acid reflux presented to the ER 10/20/24 complaining of LUQ abdominal pain which radiates to the LLQ x 2 months.  Also reports 2 day history of dark red / black stools with nausea, no vomiting.     Of note, patient has had multiple ER visits for the same complaint over the last 3 weeks.  Currently on carafate and protonix.    On arrival to the ER,  ;ab work significant for WBC 17.92, BUN 21.3, ALT 64.  UDS and UA negative.  Stool negative for occult blood.  CT A/P w/wout contrast noted diffuse fatty infiltration of the liver.  Otherwise, no acute abnormality.   Admitted to hospital medicine for further management.  GI has been consulted for abdominal pain and rectal bleeding.    Abdominal pain  - Left sided.  Worse with PO intake  - No acute findings on CT imaging   Diarrhea   - Patient reports multiple episodes of watery stools over the last 2-3 days.    Melena / Hematochezi - resolved  - 1 episode of each  -Hgb 15.5    - No indication for endoscopy at this time  - OK for diet from GI perspective.  Would recommend starting with clear liquids and advancing as tolerated to low fat diet.  - Stool studies ordered.  Will follow results. Consider antidiarrheal once acute infection is ruled out  - Continue protonix.    -Monitor stools for bleeding       Thank you for allowing us to participate in this patient's care.   Case and plan discussed with Dr. Goldsmith

## 2024-10-22 VITALS
HEART RATE: 68 BPM | DIASTOLIC BLOOD PRESSURE: 89 MMHG | TEMPERATURE: 98 F | SYSTOLIC BLOOD PRESSURE: 138 MMHG | HEIGHT: 68 IN | WEIGHT: 266 LBS | RESPIRATION RATE: 16 BRPM | OXYGEN SATURATION: 95 % | BODY MASS INDEX: 40.32 KG/M2

## 2024-10-22 PROBLEM — R10.12 LEFT UPPER QUADRANT ABDOMINAL PAIN: Status: ACTIVE | Noted: 2024-10-22

## 2024-10-22 PROBLEM — R19.7 ACUTE DIARRHEA: Status: ACTIVE | Noted: 2024-10-22

## 2024-10-22 PROBLEM — R10.12 LEFT UPPER QUADRANT ABDOMINAL PAIN: Status: RESOLVED | Noted: 2024-10-22 | Resolved: 2024-10-22

## 2024-10-22 PROBLEM — A04.0 INTESTINAL INFECTION DUE TO ENTEROPATHOGENIC E. COLI: Status: ACTIVE | Noted: 2024-10-22

## 2024-10-22 LAB
C DIFF TOX A+B STL QL IA: NEGATIVE
CLOSTRIDIUM DIFFICILE GDH ANTIGEN (OHS): NEGATIVE

## 2024-10-22 PROCEDURE — 96376 TX/PRO/DX INJ SAME DRUG ADON: CPT

## 2024-10-22 PROCEDURE — 25000003 PHARM REV CODE 250: Performed by: NURSE PRACTITIONER

## 2024-10-22 PROCEDURE — 63700000 PHARM REV CODE 250 ALT 637 W/O HCPCS: Performed by: STUDENT IN AN ORGANIZED HEALTH CARE EDUCATION/TRAINING PROGRAM

## 2024-10-22 PROCEDURE — G0378 HOSPITAL OBSERVATION PER HR: HCPCS

## 2024-10-22 PROCEDURE — 63600175 PHARM REV CODE 636 W HCPCS: Performed by: NURSE PRACTITIONER

## 2024-10-22 RX ORDER — AZITHROMYCIN 500 MG/1
500 TABLET, FILM COATED ORAL DAILY
Qty: 3 TABLET | Refills: 0 | Status: SHIPPED | OUTPATIENT
Start: 2024-10-23 | End: 2024-10-26

## 2024-10-22 RX ADMIN — ACETAMINOPHEN 1000 MG: 500 TABLET ORAL at 08:10

## 2024-10-22 RX ADMIN — PANTOPRAZOLE SODIUM 40 MG: 40 INJECTION, POWDER, LYOPHILIZED, FOR SOLUTION INTRAVENOUS at 11:10

## 2024-10-22 RX ADMIN — AZITHROMYCIN DIHYDRATE 500 MG: 250 TABLET ORAL at 08:10

## 2024-10-22 NOTE — PROGRESS NOTES
"Gastroenterology Progress Note    Subjective/Interval History:    No acute events overnight.  Remains afebrile and HDS.  Stool positive for E. Coli (ETEC).  Started on azithromycin.  Tolerating a regular diet.   Patient reports feeling better.  Had 2 liquid stools so far today.      ROS:  Review of Systems   Constitutional:  Negative for chills, fever and malaise/fatigue.   Respiratory:  Negative for shortness of breath.    Cardiovascular:  Negative for chest pain.   Gastrointestinal:  Positive for diarrhea. Negative for abdominal pain, blood in stool, constipation, heartburn, melena, nausea and vomiting.   Musculoskeletal:  Negative for myalgias.   Neurological:  Negative for dizziness and weakness.       Vital Signs:  /86   Pulse 72   Temp 98 °F (36.7 °C) (Oral)   Resp 16   Ht 5' 8" (1.727 m)   Wt 120.7 kg (266 lb)   SpO2 95%   BMI 40.45 kg/m²   Body mass index is 40.45 kg/m².    Physical Exam:    Physical Exam  Constitutional:       General: He is not in acute distress.     Appearance: He is obese. He is not ill-appearing.   HENT:      Head: Normocephalic and atraumatic.      Mouth/Throat:      Mouth: Mucous membranes are moist.      Pharynx: Oropharynx is clear.   Eyes:      Extraocular Movements: Extraocular movements intact.      Pupils: Pupils are equal, round, and reactive to light.   Cardiovascular:      Rate and Rhythm: Normal rate and regular rhythm.      Pulses: Normal pulses.      Heart sounds: Normal heart sounds. No murmur heard.     No friction rub. No gallop.   Pulmonary:      Effort: Pulmonary effort is normal. No respiratory distress.      Breath sounds: Normal breath sounds. No wheezing, rhonchi or rales.   Abdominal:      General: Bowel sounds are normal. There is no distension.      Palpations: Abdomen is soft.      Tenderness: There is no abdominal tenderness. There is no guarding.   Musculoskeletal:      Cervical back: Normal range of motion and neck supple.      Right lower leg: " No edema.      Left lower leg: No edema.   Skin:     General: Skin is warm and dry.   Neurological:      General: No focal deficit present.      Mental Status: He is alert and oriented to person, place, and time.   Psychiatric:         Mood and Affect: Mood normal.         Behavior: Behavior normal.         Thought Content: Thought content normal.         Judgment: Judgment normal.     Labs:  Recent Results (from the past 48 hours)   Occult Blood, Stool 1st Specimen    Collection Time: 10/20/24  8:25 PM   Result Value Ref Range    Occult Blood Stool 1 Negative Negative   Comprehensive metabolic panel    Collection Time: 10/20/24  8:25 PM   Result Value Ref Range    Sodium 141 136 - 145 mmol/L    Potassium 3.9 3.5 - 5.1 mmol/L    Chloride 102 98 - 107 mmol/L    CO2 26 22 - 29 mmol/L    Glucose 123 (H) 74 - 100 mg/dL    Blood Urea Nitrogen 21.3 (H) 8.9 - 20.6 mg/dL    Creatinine 1.14 0.72 - 1.25 mg/dL    Calcium 10.2 8.4 - 10.2 mg/dL    Protein Total 8.6 (H) 6.4 - 8.3 gm/dL    Albumin 4.6 3.5 - 5.0 g/dL    Globulin 4.0 (H) 2.4 - 3.5 gm/dL    Albumin/Globulin Ratio 1.2 1.1 - 2.0 ratio    Bilirubin Total 0.6 <=1.5 mg/dL    ALP 65 40 - 150 unit/L    ALT 64 (H) 0 - 55 unit/L    AST 27 5 - 34 unit/L    eGFR >60 mL/min/1.73/m2    Anion Gap 13.0 mEq/L    BUN/Creatinine Ratio 19    Lipase    Collection Time: 10/20/24  8:25 PM   Result Value Ref Range    Lipase Level 30 <=60 U/L   CBC with Differential    Collection Time: 10/20/24  8:25 PM   Result Value Ref Range    WBC 17.92 (H) 4.50 - 11.50 x10(3)/mcL    RBC 6.00 4.70 - 6.10 x10(6)/mcL    Hgb 15.5 14.0 - 18.0 g/dL    Hct 47.0 42.0 - 52.0 %    MCV 78.3 (L) 80.0 - 94.0 fL    MCH 25.8 (L) 27.0 - 31.0 pg    MCHC 33.0 33.0 - 36.0 g/dL    RDW 14.2 11.5 - 17.0 %    Platelet 314 130 - 400 x10(3)/mcL    MPV 9.6 7.4 - 10.4 fL    Neut % 66.9 %    Lymph % 23.1 %    Mono % 8.8 %    Eos % 0.1 %    Basophil % 0.3 %    Lymph # 4.14 0.6 - 4.6 x10(3)/mcL    Neut # 11.99 (H) 2.1 - 9.2  x10(3)/mcL    Mono # 1.58 (H) 0.1 - 1.3 x10(3)/mcL    Eos # 0.02 0 - 0.9 x10(3)/mcL    Baso # 0.05 <=0.2 x10(3)/mcL    IG# 0.14 (H) 0 - 0.04 x10(3)/mcL    IG% 0.8 %    NRBC% 0.0 %   Urinalysis, Reflex to Urine Culture    Collection Time: 10/20/24  8:25 PM    Specimen: Urine   Result Value Ref Range    Color, UA Straw Yellow, Light-Yellow, Dark Yellow, Diana, Straw    Appearance, UA Clear Clear    Specific Gravity, UA 1.020 1.005 - 1.030    pH, UA 6.5 5.0 - 8.5    Protein, UA Negative Negative    Glucose, UA Negative Negative, Normal    Ketones, UA Negative Negative    Blood, UA Negative Negative    Bilirubin, UA Negative Negative    Urobilinogen, UA 0.2 0.2, 1.0, Normal    Nitrites, UA Negative Negative    Leukocyte Esterase, UA Negative Negative   Drug Screen, Urine    Collection Time: 10/20/24  8:25 PM   Result Value Ref Range    Amphetamines, Urine Negative Negative    Barbiturates, Urine Negative Negative    Benzodiazepine, Urine Negative Negative    Cannabinoids, Urine Negative Negative    Cocaine, Urine Negative Negative    Fentanyl, Urine Negative Negative    MDMA, Urine Negative Negative    Opiates, Urine Negative Negative    Phencyclidine, Urine Negative Negative    pH, Urine 6.5 3.0 - 11.0    Specific Gravity, Urine Auto 1.020 1.001 - 1.035   GI Panel    Collection Time: 10/21/24  3:01 PM   Result Value Ref Range    CAMPYLOBACTER Not Detected Not Detected    PLESIOMONAS SHIGELLOIDES Not Detected Not Detected    SALMONELLA Not Detected Not Detected    Vibrio sp. Not Detected Not Detected    VIBRIO CHOLERAE Not Detected Not Detected    YERSINIA ENTEROCOLITICA Not Detected Not Detected    ENTEROAGGREGATIVE E. COLI (EAEC) Not Detected Not Detected    ENTEROPATHOGENIC E. COLI (EPEC) Detected (A) Not Detected    Enterotoxigenic E. coli (ETEC) Not Detected Not Detected    SHIGA-LIKE TOXIN-PRODUCING E. COLI (STEC) Not Detected Not Detected    Shigella/Enteroinvasive E. coli (EIEC) Not Detected Not Detected     CRYPTOSPORIDIUM Not Detected Not Detected    Cyclospora cayetanensis Not Detected Not Detected    Entamoeba histolytica Not Detected Not Detected    Giardia lamblia Not Detected Not Detected    Adenovirus F 40/41 Not Detected Not Detected    Astrovirus Not Detected Not Detected    Norovirus GI/GII Not Detected Not Detected    Rotavirus A Not Detected Not Detected    Sapovirus Not Detected Not Detected   Occult Blood Stool, CA Screen    Collection Time: 10/21/24  3:02 PM   Result Value Ref Range    Stool Color 1 Brown     Stool Consistancy 1 soft     Occult Blood Stool 1 Negative Negative   Clostridium Diff Toxin, A & B, EIA    Collection Time: 10/21/24  3:02 PM    Specimen: Stool   Result Value Ref Range    Clostridium Difficile GDH Antigen Negative Negative    Clostridium Difficile Toxin A/B Negative Negative   Stool Culture    Collection Time: 10/21/24  3:02 PM    Specimen: Stool   Result Value Ref Range    Stool Culture       Negative for Salmonella, Shigella, Campylobacter, Vibrio, Aeromonas, Pleisiomonas,Yersinia, or Shiga Toxin 1 and 2.       Imaging:  CT Abdomen Pelvis W Wo Contrast    Result Date: 10/21/2024  EXAMINATION: CT ABDOMEN PELVIS W WO CONTRAST CLINICAL HISTORY: GI bleed; TECHNIQUE: Axial images of the abdomen and pelvis were obtained with and without IV contrast administration.  Coronal and sagittal reconstructions were provided.  Three dimensional and MIP images were obtained and evaluated.  Total DLP was 3233 mGy-cm. Dose lowering technique and automated exposure control were utilized for this exam. COMPARISON: CT of the abdomen and pelvis 10/01/2024. FINDINGS: The bilateral lung bases are clear.  The heart is normal in size. There is diffuse fatty infiltration of the liver.  The portal vein is patent.  The gallbladder, spleen, pancreas, and adrenal glands are normal.  The bilateral kidneys are normal.  There is no hydronephrosis or nephrolithiasis. The stomach and small bowel are decompressed.   There is no bowel obstruction.  The colon is decompressed.  The appendix is normal.  There is no active contrast extravasation throughout the GI tract. The urinary bladder is normal.  There is no pelvic or retroperitoneal adenopathy.  The aorta is nonaneurysmal.  There is no lytic or blastic osseous lesion.     1. No acute abnormality of the abdomen and pelvis.  There is no active extravasation. 2. Hepatic steatosis. [employer] concordance Electronically signed by: Darius Garcia MD Date:    10/21/2024 Time:    06:09    CT Abdomen Pelvis With IV Contrast NO Oral Contrast    Result Date: 10/1/2024  EXAMINATION: CT ABDOMEN PELVIS WITH IV CONTRAST CLINICAL HISTORY: Abdominal abscess/infection suspected; TECHNIQUE: Low dose axial images, sagittal and coronal reformations were obtained from the lung bases to the pubic symphysis following the IV administration of contrast. Automatic exposure control (AEC) is utilized to reduce patient radiation exposure. COMPARISON: 01/16/2019 FINDINGS: The lung bases are clear. The liver appears normal.  No liver mass or lesion is seen.  Portal and hepatic veins appear normal. The gallbladder appears normal.  No obvious gallstones are seen.  No biliary dilatation is seen.  No pericholecystic fluid is seen. The pancreas appears normal.  No pancreatic mass or lesion is seen. The spleen shows no acute abnormality. The adrenal glands appear normal.  No adrenal nodule is seen. The kidneys appear normal.  No hydronephrosis is seen.  No hydroureter is seen.  No nephrolithiasis is seen.  No obvious ureteral stones are seen. Urinary bladder appears grossly unremarkable. No colitis is seen.  No diverticulitis is seen.  No obvious colonic mass or lesion is seen.  The appendix appears normal No free air is seen.  No free fluid is seen.     No abnormality seen Electronically signed by: Royal Allen Date:    10/01/2024 Time:    18:28         Assessment/Plan:    34 year old male unknown to our group with a  PMHx of HTN and acid reflux presented to the ER 10/20/24 complaining of LUQ abdominal pain which radiates to the LLQ x 2 months.  Also reports 2 day history of dark red / black stools with nausea, no vomiting.     Of note, patient has had multiple ER visits for the same complaint over the last 3 weeks.  Currently on carafate and protonix.    On arrival to the ER,  ;ab work significant for WBC 17.92, BUN 21.3, ALT 64.  UDS and UA negative.  Stool negative for occult blood.  CT A/P w/wout contrast noted diffuse fatty infiltration of the liver.  Otherwise, no acute abnormality.   Admitted to hospital medicine for further management.  GI has been consulted for abdominal pain and rectal bleeding.     E. Coli diarrhea  Abdominal pain  - left sided.  Resolved    - Agree with Azithromycin 500 mg.  Recommend 3 day course.  - OK for discharge from GI perspective  - GI is available if needed. Please call us back with questions, concerns, or changes. Thank you for allowing us to participate in the care of Rashaad Jj.        ACE Jiang

## 2024-10-22 NOTE — NURSING
Patient discharged home accompanied by family member. Discharge instructions provided in Angolan, pt verbalized no questions. Pt instructed to follow up with PCP. VSS, IV removed, pt in no apparent distress. Needs met, pt satisfied with care.    No

## 2024-10-22 NOTE — DISCHARGE SUMMARY
[facility] Hospital Medicine Discharge Summary    Admit Date: 10/20/2024  Discharge Date and Time: 10/22/23261:42 PM  Admitting Physician: CLAUDIA Team  Discharging Physician: Austin Bruno MD.  Primary Care Physician: Sydney, Primary Doctor  Consults: Gastroenterology    Discharge Diagnoses:  Leukocytosis-likely stress response-POA   Elevated liver enzymes-hepatic steatosis noted on imaging-POA   Diarrhea-POA   Nausea without vomiting-POA   Weakness-POA  Enteropathogenic E coli intestinal infection-azithromycin 500 mg p.o. times 5 days    Hospital Course:   Rashaad Jj is a 34 y.o. male who PMH includes HTN, GERD, sciatica; presents to the ED at [facility]  on 10/20/2024 with reports of lower abdominal pain with associated bright red bleeding per rectum with associated dark/black stools.  Patient reported lower abdominal pain primarily on the left side with associated nausea, denies any vomiting.  Patient has had multiple ED visits secondary to these complaints which he was prescribed Carafate and Protonix.  No reports of any other new foods or medications.  Patient denies frequent NSAID or aspirin use or BC powder use.  No reports of any endoscopy done in the past.  Lab work reviewed demonstrated WBCs 17.92, BUN 21.3, creatinine 1.14, glucose 123, other indices unremarkable.  CT of the abdomen and pelvis with and without contrast impression reviewed demonstrated no acute abnormality of the abdomen and pelvis, no active extravasation, hepatic steatosis.  Initial vital signs /91 pulse 98 respirations 20 temperature 98.2° F O2 saturation 99% on room air.  Patient did receive PPI therapy.  Patient was transferred from [facility] and admitted to [facility] for high-level care and GI services.  GI Services were consulted.  Patient is admitted to hospital medicine services for further management.      Dr. Bruno 10/21/2024-patient is a 34-year-old  male from [facility] with complaints of  left lower quadrant associated with episodes of diarrhea.  He refers he has had bright red blood per rectum at times.  Patient was seen by GI, GI PCR was done and pertinent for enteropathogenic E coli placed on azithromycin 500 mg p.o. daily for 3 days.  Patient with a history of hypertension with his primary care physician in [place] Dr. Jenkins.  Patient refers no nausea/vomiting.  We will feet and he tolerates p.o. antibiotics we will discharge in a.m.. Heme negative stools . Tolerationg good po intake . Improved . Will discharge on azithromycin 500 mgs to complete tx.      Pt was seen and examined on the day of discharge  Vitals:  VITAL SIGNS: 24 HRS MIN & MAX LAST   Temp  Min: 97.8 °F (36.6 °C)  Max: 98.4 °F (36.9 °C) 98 °F (36.7 °C)   BP  Min: 115/77  Max: 147/83 136/86   Pulse  Min: 72  Max: 83  72   Resp  Min: 15  Max: 16 16   SpO2  Min: 94 %  Max: 99 % 95 %       Physical Exam:  General appearance: Well-developed, well-nourished male looks stated age; reports mild abdominal complaints, family member at the bedside; translation per med used  HENT: Atraumatic head. Moist mucous membranes of oral cavity.  Eyes: PERRL  Lungs: Clear to auscultation bilaterally. No wheezing present.   Heart: Regular rate and rhythm. S1 and S2 present with no murmurs/gallop/rub. No pedal edema. No JVD present.   Abdomen: Soft, obese, non-distended, generalized TTP, no rebound tenderness/guarding. Bowel sounds are normal.   Extremities: No cyanosis, clubbing  Skin: warm and dry  Neuro:  Oriented x3, no acute focal deficits  Psych/mental status: cooperative               Microbiology Results (last 7 days)       Procedure Component Value Units Date/Time    Stool Culture [[id number]]  (Normal) Collected: 10/21/24 1502    Order Status: Completed Specimen: Stool Updated: 10/22/24 1007     Stool Culture Negative for Salmonella, Shigella, Campylobacter, Vibrio, Aeromonas, Pleisiomonas,Yersinia, or Shiga Toxin 1 and 2.    Clostridium  Diff Toxin, A & B, EIA [[id number]]  (Normal) Collected: 10/21/24 1502    Order Status: Completed Specimen: Stool Updated: 10/22/24 0917     Clostridium Difficile GDH Antigen Negative     Clostridium Difficile Toxin A/B Negative             CT Abdomen Pelvis W Wo Contrast  Narrative: EXAMINATION:  CT ABDOMEN PELVIS W WO CONTRAST    CLINICAL HISTORY:  GI bleed;    TECHNIQUE:  Axial images of the abdomen and pelvis were obtained with and without IV contrast administration.  Coronal and sagittal reconstructions were provided.  Three dimensional and MIP images were obtained and evaluated.  Total DLP was 3233 mGy-cm. Dose lowering technique and automated exposure control were utilized for this exam.    COMPARISON:  CT of the abdomen and pelvis 10/01/2024.    FINDINGS:  The bilateral lung bases are clear.  The heart is normal in size.    There is diffuse fatty infiltration of the liver.  The portal vein is patent.  The gallbladder, spleen, pancreas, and adrenal glands are normal.  The bilateral kidneys are normal.  There is no hydronephrosis or nephrolithiasis.    The stomach and small bowel are decompressed.  There is no bowel obstruction.  The colon is decompressed.  The appendix is normal.  There is no active contrast extravasation throughout the GI tract.    The urinary bladder is normal.  There is no pelvic or retroperitoneal adenopathy.  The aorta is nonaneurysmal.  There is no lytic or blastic osseous lesion.  Impression: 1. No acute abnormality of the abdomen and pelvis.  There is no active extravasation.  2. Hepatic steatosis.  [employer] concordance    Electronically signed by: Darius Garcia MD  Date:    10/21/2024  Time:    06:09         Medication List        START taking these medications      azithromycin 500 MG tablet  Commonly known as: ZITHROMAX  Take 1 tablet (500 mg total) by mouth once daily. for 3 days  Start taking on: October 23, 2024            CONTINUE taking these medications      pantoprazole 40 MG  tablet  Commonly known as: PROTONIX  Take 1 tablet (40 mg total) by mouth once daily.            STOP taking these medications      baclofen 10 MG tablet  Commonly known as: LIORESAL     sucralfate 1 gram tablet  Commonly known as: CARAFATE               Where to Get Your Medications        These medications were sent to [id number] - [place], [place] - [address]  AT [facility] [address] [place] [address] , [facility] [address]      Phone: [phone]   azithromycin 500 MG tablet          Explained in detail to the patient about the discharge plan, medications, and follow-up visits. Pt understands and agrees with the treatment plan  Discharge Disposition: home  Discharged Condition: stable  Diet-cardiac   Dietary Orders (From admission, onward)       Start     Ordered    10/21/24 1848  Diet Adult Regular Lactose Free  Diet effective now        Question:  Diet Modifier:  Answer:  Lactose Free    10/21/24 1848                   Medications Per DC med rec  Activities as tolerated   Follow-up Information       Ramesh Jenkins MD Follow up.    Specialty: Internal Medicine  Contact information:  [address] [place] [id number] [phone]                           For further questions contact hospitalist office    Discharge time 33 minutes    For worsening symptoms, chest pain, shortness of breath, increased abdominal pain, high grade fever, stroke or stroke like symptoms, immediately go to the nearest Emergency Room or call 911 as soon as possible.      Austin Gant M.D, on 10/22/2024. at 1:42 PM.

## 2024-10-22 NOTE — DISCHARGE INSTRUCTIONS
1.-medications has been sent to your pharmacy/[facility] in [place]  2.-please follow with your primary care physician

## 2024-10-22 NOTE — PLAN OF CARE
10/22/24 1121   Discharge Assessment   Assessment Type Discharge Planning Assessment   Confirmed/corrected address, phone number and insurance Yes   Confirmed Demographics Correct on Facesheet   Source of Information patient;other (see comments)  (English speaking co-worker))   When was your last doctors appointment? 10/01/24   Communicated RADHA with patient/caregiver Date not available/Unable to determine   Reason For Admission abd pain, blood in stool   People in Home spouse   Do you expect to return to your current living situation? Yes   Do you have help at home or someone to help you manage your care at home? Yes   Who are your caregiver(s) and their phone number(s)? wife   Current cognitive status: Alert/Oriented   Walking or Climbing Stairs Difficulty no   Dressing/Bathing Difficulty no   Home Layout Able to live on 1st floor   Equipment Currently Used at Home none   Readmission within 30 days? No   Patient currently being followed by outpatient case management? No   Do you currently have service(s) that help you manage your care at home? No   Do you take prescription medications? Yes   Do you have any problems affording any of your prescribed medications? No   Is the patient taking medications as prescribed? yes   Who is going to help you get home at discharge? friend   How do you get to doctors appointments? car, drives self   Are you on dialysis? No   Do you take coumadin? No   Discharge Plan A Home   Discharge Plan B Home   DME Needed Upon Discharge  none   Discharge Plan discussed with: Patient   Transition of Care Barriers None   Financial Resource Strain   How hard is it for you to pay for the very basics like food, housing, medical care, and heating? Not hard   Housing Stability   In the last 12 months, was there a time when you were not able to pay the mortgage or rent on time? N   At any time in the past 12 months, were you homeless or living in a shelter (including now)? N   Transportation Needs    Has the lack of transportation kept you from medical appointments, meetings, work or from getting things needed for daily living? No   Food Insecurity   Within the past 12 months, you worried that your food would run out before you got the money to buy more. Never true   Within the past 12 months, the food you bought just didn't last and you didn't have money to get more. Never true   Stress   Do you feel stress - tense, restless, nervous, or anxious, or unable to sleep at night because your mind is troubled all the time - these days? Not at all   Social Isolation   How often do you feel lonely or isolated from those around you?  Never   Alcohol Use   Q1: How often do you have a drink containing alcohol? Never   Q2: How many drinks containing alcohol do you have on a typical day when you are drinking? None   Q3: How often do you have six or more drinks on one occasion? Never   Utilities   In the past 12 months has the electric, gas, oil, or water company threatened to shut off services in your home? No   Health Literacy   How often do you need to have someone help you when you read instructions, pamphlets, or other written material from your doctor or pharmacy? 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  Pt states he is indep in adl's, lives with wife. No dme/hh.  Pt denies any dc needs at this time.

## 2024-10-23 LAB — BACTERIA STL CULT: NORMAL

## 2025-01-05 ENCOUNTER — HOSPITAL ENCOUNTER (EMERGENCY)
Facility: HOSPITAL | Age: 35
Discharge: HOME | End: 2025-01-05
Attending: EMERGENCY MEDICINE

## 2025-01-05 VITALS
RESPIRATION RATE: 16 BRPM | SYSTOLIC BLOOD PRESSURE: 119 MMHG | TEMPERATURE: 98 F | OXYGEN SATURATION: 99 % | HEIGHT: 68 IN | BODY MASS INDEX: 40.43 KG/M2 | DIASTOLIC BLOOD PRESSURE: 73 MMHG | HEART RATE: 88 BPM | WEIGHT: 266.75 LBS

## 2025-01-05 DIAGNOSIS — R11.2 NAUSEA AND VOMITING, UNSPECIFIED VOMITING TYPE: ICD-10-CM

## 2025-01-05 DIAGNOSIS — R10.9 ABDOMINAL PAIN, UNSPECIFIED ABDOMINAL LOCATION: Primary | ICD-10-CM

## 2025-01-05 LAB
ALBUMIN SERPL-MCNC: 4.2 G/DL (ref 3.5–5)
ALBUMIN/GLOB SERPL: 1.1 RATIO (ref 1.1–2)
ALP SERPL-CCNC: 57 UNIT/L (ref 40–150)
ALT SERPL-CCNC: 50 UNIT/L (ref 0–55)
ANION GAP SERPL CALC-SCNC: 12 MEQ/L
AST SERPL-CCNC: 25 UNIT/L (ref 5–34)
BACTERIA #/AREA URNS AUTO: ABNORMAL /HPF
BASOPHILS # BLD AUTO: 0.04 X10(3)/MCL
BASOPHILS NFR BLD AUTO: 0.4 %
BILIRUB SERPL-MCNC: 0.5 MG/DL
BILIRUB UR QL STRIP.AUTO: NEGATIVE
BUN SERPL-MCNC: 13.8 MG/DL (ref 8.9–20.6)
CALCIUM SERPL-MCNC: 9.6 MG/DL (ref 8.4–10.2)
CHLORIDE SERPL-SCNC: 106 MMOL/L (ref 98–107)
CLARITY UR: CLEAR
CO2 SERPL-SCNC: 24 MMOL/L (ref 22–29)
COLOR UR AUTO: YELLOW
CREAT SERPL-MCNC: 0.8 MG/DL (ref 0.72–1.25)
CREAT/UREA NIT SERPL: 17
EOSINOPHIL # BLD AUTO: 0.16 X10(3)/MCL (ref 0–0.9)
EOSINOPHIL NFR BLD AUTO: 1.6 %
ERYTHROCYTE [DISTWIDTH] IN BLOOD BY AUTOMATED COUNT: 13 % (ref 11.5–17)
GFR SERPLBLD CREATININE-BSD FMLA CKD-EPI: >60 ML/MIN/1.73/M2
GLOBULIN SER-MCNC: 4 GM/DL (ref 2.4–3.5)
GLUCOSE SERPL-MCNC: 120 MG/DL (ref 74–100)
GLUCOSE UR QL STRIP: NORMAL
HCT VFR BLD AUTO: 42.8 % (ref 42–52)
HGB BLD-MCNC: 13.8 G/DL (ref 14–18)
HGB UR QL STRIP: NEGATIVE
HOLD SPECIMEN: NORMAL
HYALINE CASTS #/AREA URNS LPF: ABNORMAL /LPF
IMM GRANULOCYTES # BLD AUTO: 0.05 X10(3)/MCL (ref 0–0.04)
IMM GRANULOCYTES NFR BLD AUTO: 0.5 %
KETONES UR QL STRIP: ABNORMAL
LEUKOCYTE ESTERASE UR QL STRIP: 25
LIPASE SERPL-CCNC: 26 U/L
LYMPHOCYTES # BLD AUTO: 4.19 X10(3)/MCL (ref 0.6–4.6)
LYMPHOCYTES NFR BLD AUTO: 42.1 %
MAGNESIUM SERPL-MCNC: 2 MG/DL (ref 1.6–2.6)
MCH RBC QN AUTO: 25.8 PG (ref 27–31)
MCHC RBC AUTO-ENTMCNC: 32.2 G/DL (ref 33–36)
MCV RBC AUTO: 80.1 FL (ref 80–94)
MONOCYTES # BLD AUTO: 0.75 X10(3)/MCL (ref 0.1–1.3)
MONOCYTES NFR BLD AUTO: 7.5 %
MUCOUS THREADS URNS QL MICRO: ABNORMAL /LPF
NEUTROPHILS # BLD AUTO: 4.76 X10(3)/MCL (ref 2.1–9.2)
NEUTROPHILS NFR BLD AUTO: 47.9 %
NITRITE UR QL STRIP: NEGATIVE
NRBC BLD AUTO-RTO: 0 %
PH UR STRIP: 6 [PH]
PLATELET # BLD AUTO: 281 X10(3)/MCL (ref 130–400)
PMV BLD AUTO: 10.1 FL (ref 7.4–10.4)
POTASSIUM SERPL-SCNC: 3.7 MMOL/L (ref 3.5–5.1)
PROT SERPL-MCNC: 8.2 GM/DL (ref 6.4–8.3)
PROT UR QL STRIP: ABNORMAL
RBC # BLD AUTO: 5.34 X10(6)/MCL (ref 4.7–6.1)
RBC #/AREA URNS AUTO: ABNORMAL /HPF
SODIUM SERPL-SCNC: 142 MMOL/L (ref 136–145)
SP GR UR STRIP.AUTO: 1.03 (ref 1–1.03)
SQUAMOUS #/AREA URNS LPF: ABNORMAL /HPF
UROBILINOGEN UR STRIP-ACNC: ABNORMAL
WBC # BLD AUTO: 9.95 X10(3)/MCL (ref 4.5–11.5)
WBC #/AREA URNS AUTO: ABNORMAL /HPF

## 2025-01-05 PROCEDURE — 80053 COMPREHEN METABOLIC PANEL: CPT | Performed by: EMERGENCY MEDICINE

## 2025-01-05 PROCEDURE — 96374 THER/PROPH/DIAG INJ IV PUSH: CPT

## 2025-01-05 PROCEDURE — 85025 COMPLETE CBC W/AUTO DIFF WBC: CPT | Performed by: EMERGENCY MEDICINE

## 2025-01-05 PROCEDURE — 83690 ASSAY OF LIPASE: CPT | Performed by: EMERGENCY MEDICINE

## 2025-01-05 PROCEDURE — 83735 ASSAY OF MAGNESIUM: CPT | Performed by: EMERGENCY MEDICINE

## 2025-01-05 PROCEDURE — 25500020 PHARM REV CODE 255

## 2025-01-05 PROCEDURE — 99285 EMERGENCY DEPT VISIT HI MDM: CPT | Mod: 25

## 2025-01-05 PROCEDURE — 96361 HYDRATE IV INFUSION ADD-ON: CPT

## 2025-01-05 PROCEDURE — 25000003 PHARM REV CODE 250: Performed by: EMERGENCY MEDICINE

## 2025-01-05 PROCEDURE — 81001 URINALYSIS AUTO W/SCOPE: CPT | Performed by: EMERGENCY MEDICINE

## 2025-01-05 PROCEDURE — 63600175 PHARM REV CODE 636 W HCPCS: Performed by: EMERGENCY MEDICINE

## 2025-01-05 RX ORDER — ONDANSETRON 4 MG/1
4 TABLET, ORALLY DISINTEGRATING ORAL EVERY 8 HOURS PRN
Qty: 14 TABLET | Refills: 0 | Status: SHIPPED | OUTPATIENT
Start: 2025-01-05

## 2025-01-05 RX ORDER — POLYETHYLENE GLYCOL 3350 17 G/17G
POWDER, FOR SOLUTION ORAL
COMMUNITY
Start: 2024-10-02

## 2025-01-05 RX ORDER — DICYCLOMINE HYDROCHLORIDE 20 MG/1
20 TABLET ORAL 4 TIMES DAILY PRN
Qty: 120 TABLET | Refills: 0 | Status: SHIPPED | OUTPATIENT
Start: 2025-01-05

## 2025-01-05 RX ORDER — LISINOPRIL AND HYDROCHLOROTHIAZIDE 10; 12.5 MG/1; MG/1
1 TABLET ORAL EVERY MORNING
COMMUNITY
Start: 2024-09-27

## 2025-01-05 RX ORDER — KETOROLAC TROMETHAMINE 30 MG/ML
15 INJECTION, SOLUTION INTRAMUSCULAR; INTRAVENOUS
Status: COMPLETED | OUTPATIENT
Start: 2025-01-05 | End: 2025-01-05

## 2025-01-05 RX ORDER — DICYCLOMINE HYDROCHLORIDE 20 MG/1
20 TABLET ORAL
COMMUNITY
Start: 2024-10-02 | End: 2025-01-05

## 2025-01-05 RX ADMIN — IOHEXOL 100 ML: 350 INJECTION, SOLUTION INTRAVENOUS at 05:01

## 2025-01-05 RX ADMIN — KETOROLAC TROMETHAMINE 15 MG: 30 INJECTION, SOLUTION INTRAMUSCULAR; INTRAVENOUS at 06:01

## 2025-01-05 RX ADMIN — SODIUM CHLORIDE 1000 ML: 9 INJECTION, SOLUTION INTRAVENOUS at 04:01

## 2025-01-05 NOTE — ED PROVIDER NOTES
"ED PROVIDER NOTE  1/5/2025    CHIEF COMPLAINT:   Chief Complaint   Patient presents with    Abdominal Pain     LUQ abdominal pain radiating to his back. States that when he presses the pain travels to RUQ. Says he experienced the same about 1 month ago and diagnosed with a "bacteria". Vomited yesterday. Denies diarrhea or  problems. States he completed his RX medication as prescribed.        HISTORY OF PRESENT ILLNESS:   Rashaad Jj is a 34 y.o. male who presents with chief complaint Abdominal pain.  Onset was yesterday whenever he began having periumbilical and left upper quadrant abdominal pain that he states radiates around to his back and right upper quadrant.  Associated symptoms of nausea and vomiting.  Denies diarrhea, fever.    The history is provided by the patient.         REVIEW OF SYSTEMS: as noted in the HPI.  NURSING NOTES REVIEWED      PAST MEDICAL/SURGICAL HISTORY:   Past Medical History:   Diagnosis Date    GERD (gastroesophageal reflux disease)     Hypertension     Sciatica     History reviewed. No pertinent surgical history.    FAMILY HISTORY: No family history on file.    SOCIAL HISTORY:   Social History     Tobacco Use    Smoking status: Never    Smokeless tobacco: Never   Substance Use Topics    Alcohol use: Never    Drug use: Never       ALLERGIES: Review of patient's allergies indicates:  No Known Allergies    PHYSICAL EXAM:  Initial Vitals [01/05/25 1534]   BP Pulse Resp Temp SpO2   130/85 102 16 97.5 °F (36.4 °C) 100 %      MAP       --         Physical Exam    Nursing note and vitals reviewed.  Constitutional: He appears well-developed and well-nourished. No distress.   HENT:   Head: Normocephalic and atraumatic.   Nose: Nose normal. Mouth/Throat: Oropharynx is clear and moist and mucous membranes are normal.   Eyes: Conjunctivae and EOM are normal. Pupils are equal, round, and reactive to light.   Neck: Neck supple. No tracheal deviation present.   Cardiovascular:  Regular " rhythm, normal heart sounds, intact distal pulses and normal pulses.   Tachycardia present.         Pulmonary/Chest: Effort normal and breath sounds normal. No respiratory distress.   Abdominal: Abdomen is soft. There is abdominal tenderness in the periumbilical area, left upper quadrant and left lower quadrant. There is no rebound and no guarding.   Musculoskeletal:         General: Normal range of motion.      Cervical back: Neck supple.     Neurological: He is alert and oriented to person, place, and time. GCS eye subscore is 4. GCS verbal subscore is 5. GCS motor subscore is 6.   CN II-XII intact. Moves all extremities. No gross sensory or motor deficits.   Skin: Skin is warm, dry and intact.   Psychiatric: He has a normal mood and affect. His speech is normal and behavior is normal. Judgment and thought content normal. Cognition and memory are normal.         RESULTS:  Labs Reviewed   COMPREHENSIVE METABOLIC PANEL - Abnormal       Result Value    Sodium 142      Potassium 3.7      Chloride 106      CO2 24      Glucose 120 (*)     Blood Urea Nitrogen 13.8      Creatinine 0.80      Calcium 9.6      Protein Total 8.2      Albumin 4.2      Globulin 4.0 (*)     Albumin/Globulin Ratio 1.1      Bilirubin Total 0.5      ALP 57      ALT 50      AST 25      eGFR >60      Anion Gap 12.0      BUN/Creatinine Ratio 17     URINALYSIS, REFLEX TO URINE CULTURE - Abnormal    Color, UA Yellow      Appearance, UA Clear      Specific Gravity, UA 1.035 (*)     pH, UA 6.0      Protein, UA 1+ (*)     Glucose, UA Normal      Ketones, UA Trace (*)     Blood, UA Negative      Bilirubin, UA Negative      Urobilinogen, UA 2+ (*)     Nitrites, UA Negative      Leukocyte Esterase, UA 25 (*)     RBC, UA 0-5      WBC, UA 0-5      Bacteria, UA Trace (*)     Squamous Epithelial Cells, UA Trace (*)     Mucous, UA Many (*)     Hyaline Casts, UA None Seen     CBC WITH DIFFERENTIAL - Abnormal    WBC 9.95      RBC 5.34      Hgb 13.8 (*)     Hct 42.8       MCV 80.1      MCH 25.8 (*)     MCHC 32.2 (*)     RDW 13.0      Platelet 281      MPV 10.1      Neut % 47.9      Lymph % 42.1      Mono % 7.5      Eos % 1.6      Basophil % 0.4      Lymph # 4.19      Neut # 4.76      Mono # 0.75      Eos # 0.16      Baso # 0.04      IG# 0.05 (*)     IG% 0.5      NRBC% 0.0     LIPASE - Normal    Lipase Level 26     MAGNESIUM - Normal    Magnesium Level 2.00     CBC W/ AUTO DIFFERENTIAL    Narrative:     The following orders were created for panel order CBC auto differential.  Procedure                               Abnormality         Status                     ---------                               -----------         ------                     CBC with Differential[[id number]]       Abnormal            Final result                 Please view results for these tests on the individual orders.   EXTRA TUBES    Narrative:     The following orders were created for panel order EXTRA TUBES.  Procedure                               Abnormality         Status                     ---------                               -----------         ------                     Light Blue Top Hold[[id number]]                             Final result               Gold Top Hold[[id number]]                                   Final result               Pink Top Hold[[id number]]                                   Final result                 Please view results for these tests on the individual orders.   LIGHT BLUE TOP HOLD    Extra Tube Hold for add-ons.     GOLD TOP HOLD    Extra Tube Hold for add-ons.     PINK TOP HOLD    Extra Tube Hold for add-ons.       Imaging Results              CT Abdomen Pelvis With IV Contrast NO Oral Contrast (Final result)  Result time 01/05/25 18:26:36      Final result by Gustavo Davis MD (01/05/25 18:26:36)                   Impression:      No abdominopelvic visceral acute findings identified.      Electronically signed by: Gustavo  Davis  Date:    01/05/2025  Time:    18:26               Narrative:    EXAMINATION:  CT ABDOMEN PELVIS WITH IV CONTRAST    CLINICAL HISTORY:  Nausea/vomiting;Epigastric pain;    TECHNIQUE:  Multidetector axial images were obtained of the abdomen and pelvis following the administration of IV contrast. Oral contrast was not administered.    Dose length product of 674 mGycm.  Automated exposure control was utilized to minimize radiation dose.    COMPARISON:  October 20, 2024.    FINDINGS:  Included portion of the lungs are without suspicious nodularity, acute air space infiltrates or fluid within the pleural spaces.    Liver is remarkable steatosis without focal space occupying lesion.  Gallbladder wall is not thickened and there is no intra luminal calcified calculus. No biliary dilation identified. Pancreatic unremarkable attenuation without acute peripancreatic phlegmons. Main pancreatic duct is not dilated. Spleen is of normal size without focal lesion.    The adrenal glands appear within normal limits. The kidneys are unremarkable in size and contour. No solid or cystic renal lesion identified. There is no hydronephrosis. No perinephric fluid strandings or collections identified.    Stomach is nondistended.  There is no abnormal dilatation of loops of small bowel and there is no focal or generalized mural thickening.  Appendix is unremarkable on image 94 series 2.  Colon is nondistended there are no pericolonic acute str 6 andings.  No bowel obstruction.  No free fluid.    Urinary bladder wall is not thickened.  There is no pelvic free fluid.    No acute or otherwise osseous abnormality identified.                                      PROCEDURES:  Procedures    ECG:       ED COURSE AND MEDICAL DECISION MAKING:  Medications   ketorolac injection 15 mg (has no administration in time range)   sodium chloride 0.9% bolus 1,000 mL 1,000 mL (0 mLs Intravenous Stopped 1/5/25 1704)   iohexoL (OMNIPAQUE 350) 350 mg  iodine/mL injection (100 mLs  Given 1/5/25 1730)     ED Course as of 01/05/25 1839   Sun Jan 05, 2025   1642 WBC: 9.95 [IB]   1642 Hemoglobin(!): 13.8 [IB]   1642 Platelet Count: 281 [IB]   1646 Creatinine: 0.80 [IB]   1646 Lipase: 26 [IB]   1646 Magnesium : 2.00 [IB]      ED Course User Index  [IB] Darryl Naylor DO        Medical Decision Making  This patient presents with abdominal pain of unclear etiology. A CT scan was performed to evaluate for potential causes of the abdominal pain, however, neither the clinical exam nor the CT has identified an emergent etiology for the abdominal pain. Specifically, given the benign exam, the laboratory studies, and unremarkable CT, I have a very low suspicion for appendicitis, ischemic bowel, bowel perforation, or any other life threatening disease. I have discussed with the patient the level of uncertainty with undifferentiated abdominal pain and clearly explained the need to follow-up as noted on the discharge instructions, or return to the Emergency Department immediately if the pain worsens, develops fever, persistent and uncontrollable vomiting, or for any new symptoms or concerns.  Given strict ED return precautions. I have spoken with the patient and/or caregivers. I have explained the patient's condition, diagnoses and treatment plan based on the information available to me at this time. I have answered the patient's and/or caregiver's questions and addressed any concerns. The patient and/or caregivers have as good an understanding of the patient's diagnosis, condition and treatment plan as can be expected at this point. The vital signs have been stable. The patient's condition is stable and appropriate for discharge from the emergency department.     The patient will pursue further outpatient evaluation with the primary care physician or other designated or consulting physician as outlined in the discharge instructions. The patient and/or caregivers are agreeable  to this plan of care and follow-up instructions have been explained in detail. The patient and/or caregivers have received these instructions in written format and have expressed an understanding of the discharge instructions. The patient and/or caregivers are aware that any significant change in condition or worsening of symptoms should prompt an immediate return to this or the closest emergency department or a call to 911.    Amount and/or Complexity of Data Reviewed  Labs: ordered. Decision-making details documented in ED Course.  Radiology: ordered.    Risk  OTC drugs.  Prescription drug management.  Decision regarding hospitalization.        CLINICAL IMPRESSION:  1. Abdominal pain, unspecified abdominal location    2. Nausea and vomiting, unspecified vomiting type        DISPOSITION:   ED Disposition Condition    Discharge Stable            ED Prescriptions       Medication Sig Dispense Start Date End Date Auth. Provider    ondansetron (ZOFRAN-ODT) 4 MG TbDL Take 1 tablet (4 mg total) by mouth every 8 (eight) hours as needed (nausea and vomiting). 14 tablet 1/5/2025 -- Darryl Naylor DO    dicyclomine (BENTYL) 20 mg tablet Take 1 tablet (20 mg total) by mouth 4 (four) times daily as needed (abdominal pain). 120 tablet 1/5/2025 -- Darryl Naylor DO          Follow-up Information       Follow up With Specialties Details Why Contact Info    [facility] - Emergency Dept Emergency Medicine  If symptoms worsen [address] [facility] [id number] [phone]               Darryl Naylor DO  01/05/25 6314

## 2025-06-18 ENCOUNTER — HOSPITAL ENCOUNTER (EMERGENCY)
Facility: HOSPITAL | Age: 35
Discharge: HOME | End: 2025-06-19
Attending: EMERGENCY MEDICINE

## 2025-06-18 DIAGNOSIS — N50.819 TESTICLE PAIN: ICD-10-CM

## 2025-06-18 DIAGNOSIS — R10.32 LEFT LOWER QUADRANT PAIN: Primary | ICD-10-CM

## 2025-06-18 PROCEDURE — 99285 EMERGENCY DEPT VISIT HI MDM: CPT | Mod: 25

## 2025-06-19 VITALS
TEMPERATURE: 98 F | BODY MASS INDEX: 42.23 KG/M2 | RESPIRATION RATE: 20 BRPM | HEIGHT: 68 IN | WEIGHT: 278.63 LBS | HEART RATE: 89 BPM | DIASTOLIC BLOOD PRESSURE: 69 MMHG | OXYGEN SATURATION: 97 % | SYSTOLIC BLOOD PRESSURE: 109 MMHG

## 2025-06-19 LAB
ALBUMIN SERPL-MCNC: 4.2 G/DL (ref 3.5–5)
ALBUMIN/GLOB SERPL: 1.1 RATIO (ref 1.1–2)
ALP SERPL-CCNC: 58 UNIT/L (ref 40–150)
ALT SERPL-CCNC: 43 UNIT/L (ref 0–55)
ANION GAP SERPL CALC-SCNC: 9 MEQ/L
AST SERPL-CCNC: 22 UNIT/L (ref 11–45)
BASOPHILS # BLD AUTO: 0.08 X10(3)/MCL
BASOPHILS NFR BLD AUTO: 0.6 %
BILIRUB SERPL-MCNC: 0.3 MG/DL
BILIRUB UR QL STRIP.AUTO: NEGATIVE
BUN SERPL-MCNC: 22.7 MG/DL (ref 8.9–20.6)
C TRACH DNA SPEC QL NAA+PROBE: NOT DETECTED
CALCIUM SERPL-MCNC: 9.3 MG/DL (ref 8.4–10.2)
CHLORIDE SERPL-SCNC: 106 MMOL/L (ref 98–107)
CLARITY UR: CLEAR
CO2 SERPL-SCNC: 26 MMOL/L (ref 22–29)
COLOR UR AUTO: NORMAL
CREAT SERPL-MCNC: 0.94 MG/DL (ref 0.72–1.25)
CREAT/UREA NIT SERPL: 24
EOSINOPHIL # BLD AUTO: 0.15 X10(3)/MCL (ref 0–0.9)
EOSINOPHIL NFR BLD AUTO: 1.2 %
ERYTHROCYTE [DISTWIDTH] IN BLOOD BY AUTOMATED COUNT: 14.4 % (ref 11.5–17)
GFR SERPLBLD CREATININE-BSD FMLA CKD-EPI: >60 ML/MIN/1.73/M2
GLOBULIN SER-MCNC: 4 GM/DL (ref 2.4–3.5)
GLUCOSE SERPL-MCNC: 116 MG/DL (ref 74–100)
GLUCOSE UR QL STRIP: NEGATIVE
HCT VFR BLD AUTO: 41.4 % (ref 42–52)
HGB BLD-MCNC: 13.1 G/DL (ref 14–18)
HGB UR QL STRIP: NEGATIVE
IMM GRANULOCYTES # BLD AUTO: 0.06 X10(3)/MCL (ref 0–0.04)
IMM GRANULOCYTES NFR BLD AUTO: 0.5 %
KETONES UR QL STRIP: NEGATIVE
LEUKOCYTE ESTERASE UR QL STRIP: NEGATIVE
LIPASE SERPL-CCNC: 25 U/L
LYMPHOCYTES # BLD AUTO: 5.1 X10(3)/MCL (ref 0.6–4.6)
LYMPHOCYTES NFR BLD AUTO: 39.7 %
MCH RBC QN AUTO: 25 PG (ref 27–31)
MCHC RBC AUTO-ENTMCNC: 31.6 G/DL (ref 33–36)
MCV RBC AUTO: 79.2 FL (ref 80–94)
MONOCYTES # BLD AUTO: 1.36 X10(3)/MCL (ref 0.1–1.3)
MONOCYTES NFR BLD AUTO: 10.6 %
N GONORRHOEA DNA SPEC QL NAA+PROBE: NOT DETECTED
NEUTROPHILS # BLD AUTO: 6.11 X10(3)/MCL (ref 2.1–9.2)
NEUTROPHILS NFR BLD AUTO: 47.4 %
NITRITE UR QL STRIP: NEGATIVE
NRBC BLD AUTO-RTO: 0 %
PH UR STRIP: 6 [PH]
PLATELET # BLD AUTO: 262 X10(3)/MCL (ref 130–400)
PMV BLD AUTO: 10.1 FL (ref 7.4–10.4)
POTASSIUM SERPL-SCNC: 3.5 MMOL/L (ref 3.5–5.1)
PROT SERPL-MCNC: 8.2 GM/DL (ref 6.4–8.3)
PROT UR QL STRIP: NEGATIVE
RBC # BLD AUTO: 5.23 X10(6)/MCL (ref 4.7–6.1)
SODIUM SERPL-SCNC: 141 MMOL/L (ref 136–145)
SP GR UR STRIP.AUTO: 1.01 (ref 1–1.03)
SPECIMEN SOURCE: NORMAL
UROBILINOGEN UR STRIP-ACNC: 0.2
WBC # BLD AUTO: 12.86 X10(3)/MCL (ref 4.5–11.5)

## 2025-06-19 PROCEDURE — 83690 ASSAY OF LIPASE: CPT | Performed by: EMERGENCY MEDICINE

## 2025-06-19 PROCEDURE — 81003 URINALYSIS AUTO W/O SCOPE: CPT | Performed by: EMERGENCY MEDICINE

## 2025-06-19 PROCEDURE — 85025 COMPLETE CBC W/AUTO DIFF WBC: CPT | Performed by: EMERGENCY MEDICINE

## 2025-06-19 PROCEDURE — 25500020 PHARM REV CODE 255: Performed by: EMERGENCY MEDICINE

## 2025-06-19 PROCEDURE — 87491 CHLMYD TRACH DNA AMP PROBE: CPT | Performed by: EMERGENCY MEDICINE

## 2025-06-19 PROCEDURE — 63600175 PHARM REV CODE 636 W HCPCS: Mod: JZ,TB | Performed by: EMERGENCY MEDICINE

## 2025-06-19 PROCEDURE — 96374 THER/PROPH/DIAG INJ IV PUSH: CPT

## 2025-06-19 PROCEDURE — 80053 COMPREHEN METABOLIC PANEL: CPT | Performed by: EMERGENCY MEDICINE

## 2025-06-19 PROCEDURE — 25000003 PHARM REV CODE 250: Performed by: EMERGENCY MEDICINE

## 2025-06-19 RX ORDER — SODIUM CHLORIDE 9 MG/ML
500 INJECTION, SOLUTION INTRAVENOUS
Status: COMPLETED | OUTPATIENT
Start: 2025-06-19 | End: 2025-06-19

## 2025-06-19 RX ORDER — METHOCARBAMOL 500 MG/1
1000 TABLET, FILM COATED ORAL 3 TIMES DAILY
Qty: 30 TABLET | Refills: 0 | Status: SHIPPED | OUTPATIENT
Start: 2025-06-19 | End: 2025-06-24

## 2025-06-19 RX ORDER — KETOROLAC TROMETHAMINE 10 MG/1
10 TABLET, FILM COATED ORAL EVERY 6 HOURS
Qty: 20 TABLET | Refills: 0 | Status: SHIPPED | OUTPATIENT
Start: 2025-06-19 | End: 2025-06-24

## 2025-06-19 RX ORDER — KETOROLAC TROMETHAMINE 30 MG/ML
30 INJECTION, SOLUTION INTRAMUSCULAR; INTRAVENOUS
Status: COMPLETED | OUTPATIENT
Start: 2025-06-19 | End: 2025-06-19

## 2025-06-19 RX ADMIN — SODIUM CHLORIDE 500 ML: 9 INJECTION, SOLUTION INTRAVENOUS at 02:06

## 2025-06-19 RX ADMIN — KETOROLAC TROMETHAMINE 30 MG: 30 INJECTION, SOLUTION INTRAMUSCULAR; INTRAVENOUS at 02:06

## 2025-06-19 RX ADMIN — IOHEXOL 100 ML: 350 INJECTION, SOLUTION INTRAVENOUS at 02:06

## 2025-06-19 NOTE — ED PROVIDER NOTES
Encounter Date: 6/18/2025       History     Chief Complaint   Patient presents with    Testicle Pain     L testicle pain x 1.5 wks. Denies swelling or redness. Also c/o L side pain, nausea, but denies v/d, and has some SOB. Denies urinary issues.     Patient is a 34-year-old male presenting with left testicle pain for the last week and a half.  Patient reports has been constant.  Worse with standing.  Rates pain 8/10.  He denies any urinary changes or hematuria.  He denies any constipation or diarrhea.   He does report some subjective fevers and chills.  He denies any prior history of kidney stones.  He also has pain in the left lower quadrant.  He has chronic left flank pain from disc herniations that is at his baseline.  He denies any swelling or redness left testicle but feels like there is a painful ball that is tender to palpation        Review of patient's allergies indicates:  No Known Allergies  Past Medical History:   Diagnosis Date    GERD (gastroesophageal reflux disease)     Hypertension     Sciatica      History reviewed. No pertinent surgical history.  No family history on file.  Social History[1]  Review of Systems   All other systems reviewed and are negative.      Physical Exam     Initial Vitals   BP Pulse Resp Temp SpO2   06/19/25 0004 06/19/25 0004 06/19/25 0004 06/19/25 0006 06/19/25 0004   (!) 148/99 (!) 115 20 98.4 °F (36.9 °C) 98 %      MAP       --                Physical Exam    Nursing note and vitals reviewed.  Constitutional: He appears well-developed and well-nourished. He is not diaphoretic. No distress.   HENT:   Head: Normocephalic and atraumatic.   Neck: Neck supple.   Cardiovascular:  Normal rate, regular rhythm and normal heart sounds.           Pulmonary/Chest: Breath sounds normal. No respiratory distress. He has no wheezes. He has no rhonchi.   Abdominal: Abdomen is soft. Bowel sounds are normal. He exhibits no distension. There is abdominal tenderness (LLQ). There is no  rebound and no guarding.   Genitourinary:    Penis normal.      Genitourinary Comments: Testicles appear grossly normal. No noted swelling or erythema. I do not palpate a distinct mass; cremasterics present.      Musculoskeletal:         General: No edema. Normal range of motion.      Cervical back: Neck supple.     Neurological: He is alert and oriented to person, place, and time.   Skin: Skin is warm and dry.   Psychiatric: He has a normal mood and affect. Thought content normal.         ED Course   Procedures  Labs Reviewed   COMPREHENSIVE METABOLIC PANEL - Abnormal       Result Value    Sodium 141      Potassium 3.5      Chloride 106      CO2 26      Glucose 116 (*)     Blood Urea Nitrogen 22.7 (*)     Creatinine 0.94      Calcium 9.3      Protein Total 8.2      Albumin 4.2      Globulin 4.0 (*)     Albumin/Globulin Ratio 1.1      Bilirubin Total 0.3      ALP 58      ALT 43      AST 22      eGFR >60      Anion Gap 9.0      BUN/Creatinine Ratio 24     CBC WITH DIFFERENTIAL - Abnormal    WBC 12.86 (*)     RBC 5.23      Hgb 13.1 (*)     Hct 41.4 (*)     MCV 79.2 (*)     MCH 25.0 (*)     MCHC 31.6 (*)     RDW 14.4      Platelet 262      MPV 10.1      Neut % 47.4      Lymph % 39.7      Mono % 10.6      Eos % 1.2      Basophil % 0.6      Imm Grans % 0.5      Neut # 6.11      Lymph # 5.10 (*)     Mono # 1.36 (*)     Eos # 0.15      Baso # 0.08      Imm Gran # 0.06 (*)     NRBC% 0.0     URINALYSIS, REFLEX TO URINE CULTURE - Normal    Color, UA Straw      Appearance, UA Clear      Specific Gravity, UA 1.015      pH, UA 6.0      Protein, UA Negative      Glucose, UA Negative      Ketones, UA Negative      Blood, UA Negative      Bilirubin, UA Negative      Urobilinogen, UA 0.2      Nitrites, UA Negative      Leukocyte Esterase, UA Negative     LIPASE - Normal    Lipase Level 25     CHLAMYDIA/GONORRHOEAE(GC), PCR    Chlamydia trachomatis PCR Not Detected      N. gonorrhea PCR Not Detected      Source Urine, Clean Catch       Narrative:     The Xpert CT/NG test, performed on the GeneXpert system is a qualitative in vitro real-time polymerase chain reaction (PCR) test for the automated detected and differentiation for genomic DNA from Chlamydia trachomatis (CT) and/or Neisseria gonorrhoeae (NG).   CBC W/ AUTO DIFFERENTIAL    Narrative:     The following orders were created for panel order CBC W/ AUTO DIFFERENTIAL.  Procedure                               Abnormality         Status                     ---------                               -----------         ------                     CBC with Differential[[id number]]       Abnormal            Final result                 Please view results for these tests on the individual orders.          Imaging Results              CT Abdomen Pelvis With IV Contrast NO Oral Contrast (Final result)  Result time 06/19/25 07:11:27      Final result by Mark Garcia MD (06/19/25 07:11:27)                   Impression:      1. No evidence of acute abdominopelvic process.  [facility] concurrence      Electronically signed by: Mark Garcia MD  Date:    06/19/2025  Time:    07:11               Narrative:    EXAMINATION:  CT ABDOMEN PELVIS WITH IV CONTRAST    CLINICAL HISTORY:  Left lower quadrant pain    TECHNIQUE:  Axial CT images were obtained through the abdomen and pelvis following IV administration of contrast.  .  Coronal and sagittal reconstructions submitted and interpreted.  Automated exposure control, dose radiation lowering technique, was utilized.    COMPARISON:  CT abdomen and pelvis from 01/05/2025    CT abdomen and pelvis from 10/20/2024    FINDINGS:  Lines and Tubes: None.    Thorax:    Lungs: The visualized lung bases appear unremarkable. No focal infiltrate or consolidation is seen.    Pleura: No effusions or thickening are seen.    Heart: The heart size is within normal limits.    Abdomen:    Abdominal Wall: No abdominal wall pathology is seen.    Liver: Mild fatty  infiltration of the liver is present. The liver otherwise appears unremarkable.    Biliary System: No intrahepatic or extrahepatic biliary duct dilatation is seen.    Gallbladder: The gallbladder appears unremarkable with no stones wall thickening or pericholecystic inflammatory changes or fluid.    Pancreas: The pancreas appears unremarkable. No pancreatic mass, or, ductal dilatation is seen.    Adrenals: The adrenal glands appear unremarkable.    Kidneys: The kidneys appear unremarkable with no stones cysts masses or hydronephrosis.    Aorta: The visualized abdominal aorta appears unremarkable.    IVC: Unremarkable.    Bowel:    Esophagus: The visualized distal esophagus appears unremarkable.    Stomach: The stomach appears unremarkable.    Duodenum: Unremarkable appearing duodenum.    Small Bowel: The small bowel appears unremarkable.    Colon: Nondistended.    Appendix: The appendix appears unremarkable and is seen on Image 54, Series 2 through Image 67, Series 2.    Peritoneum: No intraperitoneal free air or ascites is seen.    Pelvis:    Bladder: The bladder appears unremarkable.    Male:    Prostate gland: The prostate gland appears unremarkable.    Bony structures:    Dorsal Spine: There is subtle spondylosis of the visualized dorsal spine.    Bony Pelvis: The visualized bony structures of the pelvis appear unremarkable.                        Preliminary result by Brandt Jeong Jr., MD (06/19/25 02:09:41)                   Impression:    1. No acute intraabdominal or pelvic solid organ or bowel pathology identified. Details and other findings as discussed above.               Narrative:    START OF REPORT:  Technique: CT of the abdomen and pelvis was performed with axial images as well as sagittal and coronal reconstruction images with intravenous contrast.    Comparison: None available.    Clinical History: L testicle pain x 1.5 wks. Denies swelling or redness. Also c/o L side pain, nausea, but  denies v/d, and has some SOB. Denies urinary issues.    Dosage Information: Automated Exposure Control was utilized.    Findings:  Lines and Tubes: None.  Thorax:  Lungs: The visualized lung bases appear unremarkable. No focal infiltrate or consolidation is seen.  Pleura: No effusions or thickening are seen.  Heart: The heart size is within normal limits.  Abdomen:  Abdominal Wall: No abdominal wall pathology is seen.  Liver: Mild fatty infiltration of the liver is present. The liver otherwise appears unremarkable.  Biliary System: No intrahepatic or extrahepatic biliary duct dilatation is seen.  Gallbladder: The gallbladder appears unremarkable with no stones wall thickening or pericholecystic inflammatory changes or fluid.  Pancreas: The pancreas appears unremarkable. No pancreatic mass, or, ductal dilatation is seen.  Adrenals: The adrenal glands appear unremarkable.  Kidneys: The kidneys appear unremarkable with no stones cysts masses or hydronephrosis.  Aorta: The visualized abdominal aorta appears unremarkable.  IVC: Unremarkable.  Bowel:  Esophagus: The visualized distal esophagus appears unremarkable.  Stomach: The stomach appears unremarkable.  Duodenum: Unremarkable appearing duodenum.  Small Bowel: The small bowel appears unremarkable.  Colon: Nondistended.  Appendix: The appendix appears unremarkable and is seen on Image 54, Series 2 through Image 67, Series 2.  Peritoneum: No intraperitoneal free air or ascites is seen.    Pelvis:  Bladder: The bladder appears unremarkable.  Male:  Prostate gland: The prostate gland appears unremarkable.    Bony structures:  Dorsal Spine: There is subtle spondylosis of the visualized dorsal spine.  Bony Pelvis: The visualized bony structures of the pelvis appear unremarkable.                                         US Scrotum And Testicles (Final result)  Result time 06/19/25 06:28:55      Final result by Mahesh Abdul MD (06/19/25 06:28:55)                    Impression:      Unremarkable scrotal ultrasound.    No evidence of testicular torsion      Electronically signed by: Mahesh Arevaloharshal  Date:    06/19/2025  Time:    06:28               Narrative:    EXAMINATION:  US SCROTUM AND TESTICLES    CLINICAL HISTORY:  , Testicular pain, unspecified.    TECHNIQUE:  Multiple transverse and sagittal grayscale sonographic images were acquired through the scrotum.    FINDINGS:  Dimensions: Within normal limits measuring 4.6 x 2.5 x 2.9 cm (16.97 ml).Blood flow: Within normal limits.Intratesticular arterial blood flow: Within normal limits.Intratesticular venous blood flow: Within normal limits.Right epididymis: Unremarkable.Left scrotal soft tissues: Unremarkable.Left testicle:Dimensions: Within normal limits measuring 4.1 x 2.3 x 3.0 cm (14.58 ml).Blood flow: Within normal limits.Intratesticular arterial blood flow: Within normal limits.Intratesticular venous blood flow: Within normal limits.Left epididymis: Unremarkable                        Preliminary result by Brandt Jeong Jr., MD (06/19/25 01:31:34)                   Impression:    1. Unremarkable scrotal ultrasound. No specific evidence of testicular torsion. Details as above.               Narrative:    START OF REPORT:  Technique: Scrotal ultrasound was performed.    Comparison: None.    Clinical history: L testicular pain x 1.5 weeks.    Findings:  Right testicle:  Dimensions: Within normal limits measuring 4.6 x 2.5 x 2.9 cm (16.97 ml).  Blood flow: Within normal limits.  Intratesticular arterial blood flow: Within normal limits.  Intratesticular venous blood flow: Within normal limits.  Right epididymis: Unremarkable.  Left scrotal soft tissues: Unremarkable.  Left testicle:  Dimensions: Within normal limits measuring 4.1 x 2.3 x 3.0 cm (14.58 ml).  Blood flow: Within normal limits.  Intratesticular arterial blood flow: Within normal limits.  Intratesticular venous blood flow: Within normal limits.  Left  epididymis: Unremarkable.                                         Medications   iohexoL (OMNIPAQUE 350) injection 100 mL (100 mLs Intravenous Given 6/19/25 0201)   ketorolac injection 30 mg (30 mg Intravenous Given 6/19/25 0242)   0.9% NaCl infusion (500 mLs Intravenous New Bag 6/19/25 0242)     Medical Decision Making  Differentials considered but not limited to testicular torsion, testicular mass, epididymitis, orchitis, kidney stone, UTI, prostatitis, STI.        No emergent cause found for LLQ pain and STI. He had some improvement with Toradol. Referral sent for urology. Results were reviewed with patient. Questions were answered along with a discussion of signs and symptoms to return for. Patient comfortable with plan of discharge.      Problems Addressed:  Left lower quadrant pain: acute illness or injury  Testicle pain: acute illness or injury    Amount and/or Complexity of Data Reviewed  Labs: ordered.  Radiology: ordered.    Risk  Prescription drug management.               ED Course as of 06/22/25 1237   Thu Jun 19, 2025   0111 Patient's ultrasound reported negative by tech. [GM]   0214 No emergent findings on US or CT. I have reviewed CT scan. GC/CZ pending however UA has no signs of infection.  [GM]   0304 Pain is down to a 5/10. [GM]      ED Course User Index  [GM] Shannon Saleem MD                           Clinical Impression:  Final diagnoses:  [N50.819] Testicle pain  [N50.819] Testicle pain - L  [R10.32] Left lower quadrant pain (Primary)          ED Disposition Condition    Discharge Stable          ED Prescriptions       Medication Sig Dispense Start Date End Date Auth. Provider    ketorolac (TORADOL) 10 mg tablet Take 1 tablet (10 mg total) by mouth every 6 (six) hours. for 5 days 20 tablet 6/19/2025 6/24/2025 Shannon Saleem MD    methocarbamoL (ROBAXIN) 500 MG Tab Take 2 tablets (1,000 mg total) by mouth 3 (three) times daily. for 5 days 30 tablet 6/19/2025 6/24/2025 Shannon Saleem MD           Follow-up Information       Follow up With Specialties Details Why Contact Info    Adonay un seguimiento con dsouza médico en 3 días.    Si selene síntomas empeoran, regrese al servicio de urgencias. Si no tiene un médico de atención primaria, llame al [phone] para un seguimiento.    [facility], [facility]   Se envió jessica derivación a [facility]. Deben llamar para programar dsouza seguimiento, myla le recomendamos que llame lo antes posible para verificar el estado. Si los síntomas empeoran, regrese al servicio de urgencias. [address] [place] [address]  [phone]                     [1]   Social History  Tobacco Use    Smoking status: Never    Smokeless tobacco: Never   Substance Use Topics    Alcohol use: Never    Drug use: Never        Shannon Saleem MD  06/22/25 4715